# Patient Record
Sex: FEMALE | Race: ASIAN | NOT HISPANIC OR LATINO | Employment: FULL TIME | URBAN - METROPOLITAN AREA
[De-identification: names, ages, dates, MRNs, and addresses within clinical notes are randomized per-mention and may not be internally consistent; named-entity substitution may affect disease eponyms.]

---

## 2024-01-24 ENCOUNTER — OFFICE VISIT (OUTPATIENT)
Dept: FAMILY MEDICINE | Facility: CLINIC | Age: 29
End: 2024-01-24
Payer: COMMERCIAL

## 2024-01-24 VITALS
WEIGHT: 133.19 LBS | DIASTOLIC BLOOD PRESSURE: 68 MMHG | HEIGHT: 62 IN | HEART RATE: 61 BPM | OXYGEN SATURATION: 99 % | BODY MASS INDEX: 24.51 KG/M2 | SYSTOLIC BLOOD PRESSURE: 120 MMHG

## 2024-01-24 DIAGNOSIS — R10.84 ABDOMINAL CRAMPING, GENERALIZED: ICD-10-CM

## 2024-01-24 DIAGNOSIS — Z11.4 ENCOUNTER FOR SCREENING FOR HIV: ICD-10-CM

## 2024-01-24 DIAGNOSIS — Z28.21 INFLUENZA VACCINATION DECLINED: ICD-10-CM

## 2024-01-24 DIAGNOSIS — K52.9 CHRONIC DIARRHEA: ICD-10-CM

## 2024-01-24 DIAGNOSIS — Z11.59 NEED FOR HEPATITIS C SCREENING TEST: ICD-10-CM

## 2024-01-24 DIAGNOSIS — R53.83 FATIGUE, UNSPECIFIED TYPE: ICD-10-CM

## 2024-01-24 DIAGNOSIS — F33.0 MILD EPISODE OF RECURRENT MAJOR DEPRESSIVE DISORDER: ICD-10-CM

## 2024-01-24 DIAGNOSIS — B99.9 RECURRENT INFECTIONS: ICD-10-CM

## 2024-01-24 DIAGNOSIS — Z00.01 ENCOUNTER FOR GENERAL ADULT MEDICAL EXAMINATION WITH ABNORMAL FINDINGS: Primary | ICD-10-CM

## 2024-01-24 DIAGNOSIS — Z30.41 ORAL CONTRACEPTIVE PILL SURVEILLANCE: ICD-10-CM

## 2024-01-24 PROCEDURE — 3074F SYST BP LT 130 MM HG: CPT | Mod: CPTII,S$GLB,, | Performed by: FAMILY MEDICINE

## 2024-01-24 PROCEDURE — 3078F DIAST BP <80 MM HG: CPT | Mod: CPTII,S$GLB,, | Performed by: FAMILY MEDICINE

## 2024-01-24 PROCEDURE — 99385 PREV VISIT NEW AGE 18-39: CPT | Mod: S$GLB,,, | Performed by: FAMILY MEDICINE

## 2024-01-24 PROCEDURE — 3044F HG A1C LEVEL LT 7.0%: CPT | Mod: CPTII,S$GLB,, | Performed by: FAMILY MEDICINE

## 2024-01-24 PROCEDURE — 3008F BODY MASS INDEX DOCD: CPT | Mod: CPTII,S$GLB,, | Performed by: FAMILY MEDICINE

## 2024-01-24 PROCEDURE — 99999 PR PBB SHADOW E&M-NEW PATIENT-LVL IV: CPT | Mod: PBBFAC,,, | Performed by: FAMILY MEDICINE

## 2024-01-24 PROCEDURE — 99213 OFFICE O/P EST LOW 20 MIN: CPT | Mod: 25,S$GLB,, | Performed by: FAMILY MEDICINE

## 2024-01-24 RX ORDER — DROSPIRENONE AND ETHINYL ESTRADIOL 0.02-3(28)
1 KIT ORAL
COMMUNITY
Start: 2023-11-05 | End: 2024-01-24 | Stop reason: SDUPTHER

## 2024-01-24 RX ORDER — FAMOTIDINE 20 MG/1
20 TABLET, FILM COATED ORAL 2 TIMES DAILY
Qty: 180 TABLET | Refills: 0 | Status: SHIPPED | OUTPATIENT
Start: 2024-01-24 | End: 2024-04-26 | Stop reason: SDUPTHER

## 2024-01-24 RX ORDER — SULFAMETHOXAZOLE AND TRIMETHOPRIM 800; 160 MG/1; MG/1
1 TABLET ORAL 2 TIMES DAILY
COMMUNITY
Start: 2023-08-12 | End: 2024-01-24

## 2024-01-24 RX ORDER — DROSPIRENONE AND ETHINYL ESTRADIOL 0.02-3(28)
1 KIT ORAL DAILY
Qty: 84 TABLET | Refills: 3 | Status: SHIPPED | OUTPATIENT
Start: 2024-01-24 | End: 2024-06-12

## 2024-01-24 NOTE — LETTER
January 24, 2024      [facility]  [address]  [place] [address]  Phone: [phone]  Fax: [phone]       Patient: [facility] Alba John   YOB: 1995  Date of Visit: 01/24/2024    To Whom It May Concern:    Deedee John  was at [facility] on 01/24/2024. The patient may return to work/school on 1/25/2024  with no restrictions. If you have any questions or concerns, or if I can be of further assistance, please do not hesitate to contact me.    Sincerely,    Ameena Schafer MD

## 2024-01-29 ENCOUNTER — LAB VISIT (OUTPATIENT)
Dept: PRIMARY CARE CLINIC | Facility: CLINIC | Age: 29
End: 2024-01-29
Payer: COMMERCIAL

## 2024-01-29 DIAGNOSIS — K52.9 CHRONIC DIARRHEA: ICD-10-CM

## 2024-01-29 PROCEDURE — 89055 LEUKOCYTE ASSESSMENT FECAL: CPT | Performed by: FAMILY MEDICINE

## 2024-01-29 PROCEDURE — 87045 FECES CULTURE AEROBIC BACT: CPT | Performed by: FAMILY MEDICINE

## 2024-01-29 PROCEDURE — 87449 NOS EACH ORGANISM AG IA: CPT | Performed by: FAMILY MEDICINE

## 2024-01-29 PROCEDURE — 87427 SHIGA-LIKE TOXIN AG IA: CPT | Mod: 59 | Performed by: FAMILY MEDICINE

## 2024-01-29 PROCEDURE — 83993 ASSAY FOR CALPROTECTIN FECAL: CPT | Performed by: FAMILY MEDICINE

## 2024-01-29 PROCEDURE — 87338 HPYLORI STOOL AG IA: CPT | Performed by: FAMILY MEDICINE

## 2024-01-29 PROCEDURE — 87046 STOOL CULTR AEROBIC BACT EA: CPT | Performed by: FAMILY MEDICINE

## 2024-01-29 PROCEDURE — 82272 OCCULT BLD FECES 1-3 TESTS: CPT | Performed by: FAMILY MEDICINE

## 2024-01-29 PROCEDURE — 82653 EL-1 FECAL QUANTITATIVE: CPT | Performed by: FAMILY MEDICINE

## 2024-01-30 LAB
OB PNL STL: NEGATIVE
WBC #/AREA STL HPF: NORMAL /[HPF]

## 2024-01-31 ENCOUNTER — PATIENT MESSAGE (OUTPATIENT)
Dept: FAMILY MEDICINE | Facility: CLINIC | Age: 29
End: 2024-01-31
Payer: COMMERCIAL

## 2024-01-31 DIAGNOSIS — R11.0 NAUSEA: Primary | ICD-10-CM

## 2024-01-31 DIAGNOSIS — E78.2 MIXED HYPERLIPIDEMIA: ICD-10-CM

## 2024-01-31 DIAGNOSIS — A04.8 H. PYLORI INFECTION: Primary | ICD-10-CM

## 2024-01-31 LAB
E COLI SXT1 STL QL IA: NEGATIVE
E COLI SXT2 STL QL IA: NEGATIVE

## 2024-01-31 RX ORDER — CLARITHROMYCIN 500 MG/1
500 TABLET, FILM COATED ORAL 2 TIMES DAILY
Qty: 28 TABLET | Refills: 0 | Status: SHIPPED | OUTPATIENT
Start: 2024-01-31 | End: 2024-02-22

## 2024-01-31 RX ORDER — PANTOPRAZOLE SODIUM 40 MG/1
40 TABLET, DELAYED RELEASE ORAL 2 TIMES DAILY
Qty: 28 TABLET | Refills: 0 | Status: SHIPPED | OUTPATIENT
Start: 2024-01-31 | End: 2024-02-16 | Stop reason: SDUPTHER

## 2024-01-31 RX ORDER — AMOXICILLIN 500 MG/1
1000 CAPSULE ORAL 2 TIMES DAILY
Qty: 56 CAPSULE | Refills: 0 | Status: SHIPPED | OUTPATIENT
Start: 2024-01-31 | End: 2024-02-22

## 2024-02-01 LAB
BACTERIA STL CULT: NORMAL
CALPROTECTIN STL-MCNT: 11.3 MCG/G
ELASTASE 1, FECAL: >500 MCG/G

## 2024-02-06 PROBLEM — E78.5 HYPERLIPIDEMIA: Status: ACTIVE | Noted: 2018-05-12

## 2024-02-06 LAB — H PYLORI AG STL QL IA: DETECTED

## 2024-02-06 RX ORDER — ONDANSETRON 4 MG/1
4 TABLET, ORALLY DISINTEGRATING ORAL EVERY 12 HOURS PRN
Qty: 60 TABLET | Refills: 0 | Status: SHIPPED | OUTPATIENT
Start: 2024-02-06 | End: 2024-02-16 | Stop reason: SDUPTHER

## 2024-02-16 DIAGNOSIS — A04.8 H. PYLORI INFECTION: ICD-10-CM

## 2024-02-16 DIAGNOSIS — R11.0 NAUSEA: ICD-10-CM

## 2024-02-16 RX ORDER — PANTOPRAZOLE SODIUM 40 MG/1
40 TABLET, DELAYED RELEASE ORAL 2 TIMES DAILY
Qty: 28 TABLET | Refills: 0 | Status: SHIPPED | OUTPATIENT
Start: 2024-02-16 | End: 2024-02-22 | Stop reason: SDUPTHER

## 2024-02-16 RX ORDER — ONDANSETRON 4 MG/1
4 TABLET, ORALLY DISINTEGRATING ORAL EVERY 12 HOURS PRN
Qty: 60 TABLET | Refills: 0 | Status: SHIPPED | OUTPATIENT
Start: 2024-02-16 | End: 2024-02-28 | Stop reason: SDUPTHER

## 2024-02-16 NOTE — TELEPHONE ENCOUNTER
No care due was identified.  [facility] Embedded Care Due Messages. Reference number: [id number].   2/16/2024 2:55:11 PM CST

## 2024-02-16 NOTE — TELEPHONE ENCOUNTER
Refill Routing Note   Medication(s) are not appropriate for processing by [facility] for the following reason(s):        Outside of protocol    [facility] action(s):  Route               Appointments  past 12m or future 3m with PCP    Date Provider   Last Visit   1/24/2024 Ameena Schafer MD   Next Visit   2/22/2024 Ameena Schafer MD   ED visits in past 90 days: 0        Note composed:3:15 PM 02/16/2024

## 2024-02-22 ENCOUNTER — OFFICE VISIT (OUTPATIENT)
Dept: FAMILY MEDICINE | Facility: CLINIC | Age: 29
End: 2024-02-22
Payer: COMMERCIAL

## 2024-02-22 VITALS
DIASTOLIC BLOOD PRESSURE: 62 MMHG | WEIGHT: 134.06 LBS | HEART RATE: 79 BPM | OXYGEN SATURATION: 99 % | SYSTOLIC BLOOD PRESSURE: 118 MMHG | BODY MASS INDEX: 24.67 KG/M2 | HEIGHT: 62 IN

## 2024-02-22 DIAGNOSIS — R11.2 NAUSEA AND VOMITING, UNSPECIFIED VOMITING TYPE: ICD-10-CM

## 2024-02-22 DIAGNOSIS — R68.81 EARLY SATIETY: ICD-10-CM

## 2024-02-22 DIAGNOSIS — F33.0 MILD EPISODE OF RECURRENT MAJOR DEPRESSIVE DISORDER: ICD-10-CM

## 2024-02-22 DIAGNOSIS — E78.49 OTHER HYPERLIPIDEMIA: ICD-10-CM

## 2024-02-22 DIAGNOSIS — K21.9 GASTROESOPHAGEAL REFLUX DISEASE, UNSPECIFIED WHETHER ESOPHAGITIS PRESENT: ICD-10-CM

## 2024-02-22 DIAGNOSIS — A04.8 H. PYLORI INFECTION: Primary | ICD-10-CM

## 2024-02-22 DIAGNOSIS — Z28.21 INFLUENZA VACCINATION DECLINED: ICD-10-CM

## 2024-02-22 DIAGNOSIS — K52.9 CHRONIC DIARRHEA: ICD-10-CM

## 2024-02-22 DIAGNOSIS — B37.9 ANTIBIOTIC-INDUCED YEAST INFECTION: ICD-10-CM

## 2024-02-22 DIAGNOSIS — T36.95XA ANTIBIOTIC-INDUCED YEAST INFECTION: ICD-10-CM

## 2024-02-22 DIAGNOSIS — R10.84 ABDOMINAL CRAMPING, GENERALIZED: ICD-10-CM

## 2024-02-22 PROCEDURE — 1159F MED LIST DOCD IN RCRD: CPT | Mod: CPTII,S$GLB,, | Performed by: FAMILY MEDICINE

## 2024-02-22 PROCEDURE — 3078F DIAST BP <80 MM HG: CPT | Mod: CPTII,S$GLB,, | Performed by: FAMILY MEDICINE

## 2024-02-22 PROCEDURE — 99214 OFFICE O/P EST MOD 30 MIN: CPT | Mod: S$GLB,,, | Performed by: FAMILY MEDICINE

## 2024-02-22 PROCEDURE — 3008F BODY MASS INDEX DOCD: CPT | Mod: CPTII,S$GLB,, | Performed by: FAMILY MEDICINE

## 2024-02-22 PROCEDURE — 1160F RVW MEDS BY RX/DR IN RCRD: CPT | Mod: CPTII,S$GLB,, | Performed by: FAMILY MEDICINE

## 2024-02-22 PROCEDURE — 3074F SYST BP LT 130 MM HG: CPT | Mod: CPTII,S$GLB,, | Performed by: FAMILY MEDICINE

## 2024-02-22 PROCEDURE — 99999 PR PBB SHADOW E&M-EST. PATIENT-LVL IV: CPT | Mod: PBBFAC,,, | Performed by: FAMILY MEDICINE

## 2024-02-22 PROCEDURE — 3044F HG A1C LEVEL LT 7.0%: CPT | Mod: CPTII,S$GLB,, | Performed by: FAMILY MEDICINE

## 2024-02-22 RX ORDER — PANTOPRAZOLE SODIUM 40 MG/1
40 TABLET, DELAYED RELEASE ORAL DAILY
Qty: 90 TABLET | Refills: 1 | Status: SHIPPED | OUTPATIENT
Start: 2024-02-22

## 2024-02-22 RX ORDER — SERTRALINE HYDROCHLORIDE 50 MG/1
TABLET, FILM COATED ORAL
Qty: 90 TABLET | Refills: 0 | Status: SHIPPED | OUTPATIENT
Start: 2024-02-22 | End: 2024-04-22 | Stop reason: SDUPTHER

## 2024-02-22 RX ORDER — FLUCONAZOLE 150 MG/1
150 TABLET ORAL ONCE
Qty: 1 TABLET | Refills: 0 | Status: SHIPPED | OUTPATIENT
Start: 2024-02-22 | End: 2024-02-22

## 2024-02-22 RX ORDER — VALACYCLOVIR HYDROCHLORIDE 1 G/1
2 TABLET, FILM COATED ORAL EVERY 12 HOURS
COMMUNITY

## 2024-02-22 RX ORDER — SULFAMETHOXAZOLE AND TRIMETHOPRIM 800; 160 MG/1; MG/1
1 TABLET ORAL 2 TIMES DAILY
COMMUNITY
End: 2024-02-22

## 2024-02-22 RX ORDER — ALBUTEROL SULFATE 90 UG/1
AEROSOL, METERED RESPIRATORY (INHALATION)
COMMUNITY

## 2024-02-22 NOTE — PROGRESS NOTES
"Subjective:         Patient ID: Annelise John is a 29 y.o. female.    Chief Complaint: Results    Patient Active Problem List   Diagnosis    Mild episode of recurrent major depressive disorder    Chronic diarrhea    H. pylori infection    Hyperlipidemia      CYNDEE Willis is a 29 y.o. female who presents today for review of lab results.   Completed H. Pylori treatment, reports some mild improvement in symptoms, but not completely.     High functioning depression discussed in last visit. Interested in starting SSRI.    Review of Systems   All other systems reviewed and are negative.       Objective:     Vitals:    02/22/24 1249   BP: 118/62   BP Location: Left arm   Patient Position: Sitting   BP Method: Medium (Manual)   Pulse: 79   SpO2: 99%   Weight: 60.8 kg (134 lb 0.6 oz)   Height: 5' 2" (1.575 m)         Physical Exam  Vitals and nursing note reviewed.   Constitutional:       General: She is not in acute distress.     Appearance: Normal appearance. She is not ill-appearing, toxic-appearing or diaphoretic.   HENT:      Head: Normocephalic and atraumatic.   Eyes:      General: No scleral icterus.     Conjunctiva/sclera: Conjunctivae normal.   Cardiovascular:      Rate and Rhythm: Normal rate.   Pulmonary:      Effort: Pulmonary effort is normal. No respiratory distress.   Skin:     Coloration: Skin is not pale.   Neurological:      Mental Status: She is alert. Mental status is at baseline.   Psychiatric:         Attention and Perception: Attention and perception normal.         Mood and Affect: Mood and affect normal.         Speech: Speech normal.         Behavior: Behavior normal.         Cognition and Memory: Cognition and memory normal.         Judgment: Judgment normal.       Assessment:       1. H. pylori infection    2. Nausea and vomiting, unspecified vomiting type    3. Abdominal cramping, generalized    4. Antibiotic-induced yeast infection    5. Gastroesophageal reflux disease, unspecified " whether esophagitis present    6. Early satiety    7. Mild episode of recurrent major depressive disorder    8. Influenza vaccination declined    9. Chronic diarrhea    10. Other hyperlipidemia        Plan:   Recent relevant labs results reviewed with patient.     Lab results do not support an inflammatory process. Gastric emptying study given burping up food hours later and early satiety. Take ppi and GI appt.       1. H. pylori infection  -     Ambulatory referral/consult to Gastroenterology; Future; Expected date: 02/29/2024  Treated, symptoms more complex than explained by H. Pylori infection alone. Continue ppi.     2. Nausea and vomiting, unspecified vomiting type  3. Abdominal cramping, generalized  -     Ambulatory referral/consult to Gastroenterology; Future; Expected date: 02/29/2024  GI work up  -     Ambulatory referral/consult to Gastroenterology; Future; Expected date: 02/29/2024  -     NM Gastric Emptying; Future; Expected date: 02/22/2024    4. Antibiotic-induced yeast infection  -     fluconazole (DIFLUCAN) 150 MG Tab; Take 1 tablet (150 mg total) by mouth once. for 1 dose  Dispense: 1 tablet; Refill: 0    5. Gastroesophageal reflux disease, unspecified whether esophagitis present  -     pantoprazole (PROTONIX) 40 MG tablet; Take 1 tablet (40 mg total) by mouth once daily.  Dispense: 90 tablet; Refill: 1    6. Early satiety  -     NM Gastric Emptying; Future; Expected date: 02/22/2024    7. Mild episode of recurrent major depressive disorder  -     sertraline (ZOLOFT) 50 MG tablet; Take 1 tab po daily x 2 weeks, then 2 tabs po daily thereafter  Dispense: 90 tablet; Refill: 0    8. Influenza vaccination declined    HLD - lifestyle modifications.     Patient's questions answered. Plan reviewed with patient at the end of visit. Relevant precautions to chief complaint and reasons to seek further medical care or to contact the office sooner reviewed with patient.     Follow up in about 6 weeks (around  4/4/2024) for Pap Smear, f/u s/p new medication/ titration.

## 2024-02-28 ENCOUNTER — PATIENT MESSAGE (OUTPATIENT)
Dept: FAMILY MEDICINE | Facility: CLINIC | Age: 29
End: 2024-02-28
Payer: COMMERCIAL

## 2024-02-28 DIAGNOSIS — R11.0 NAUSEA: ICD-10-CM

## 2024-02-28 RX ORDER — ONDANSETRON 4 MG/1
4 TABLET, ORALLY DISINTEGRATING ORAL EVERY 12 HOURS PRN
Qty: 60 TABLET | Refills: 0 | Status: SHIPPED | OUTPATIENT
Start: 2024-02-28 | End: 2024-03-01 | Stop reason: SDUPTHER

## 2024-02-28 NOTE — TELEPHONE ENCOUNTER
No care due was identified.  [facility] Embedded Care Due Messages. Reference number: [id number].   2/28/2024 1:57:41 PM CST

## 2024-02-28 NOTE — TELEPHONE ENCOUNTER
Refill Routing Note   Medication(s) are not appropriate for processing by [facility] for the following reason(s):        Outside of protocol    [facility] action(s):  Route               Appointments  past 12m or future 3m with PCP    Date Provider   Last Visit   2/22/2024 Ameena Schafer MD   Next Visit   3/5/2024 Ameena Schafer MD   ED visits in past 90 days: 0        Note composed:3:03 PM 02/28/2024

## 2024-03-01 DIAGNOSIS — R19.4 CHANGE IN BOWEL HABITS: ICD-10-CM

## 2024-03-01 DIAGNOSIS — R11.0 NAUSEA: ICD-10-CM

## 2024-03-01 DIAGNOSIS — R10.13 ABDOMINAL PAIN, EPIGASTRIC: Primary | ICD-10-CM

## 2024-03-01 RX ORDER — ONDANSETRON 4 MG/1
4 TABLET, ORALLY DISINTEGRATING ORAL EVERY 12 HOURS PRN
Qty: 60 TABLET | Refills: 0 | Status: SHIPPED | OUTPATIENT
Start: 2024-03-01

## 2024-03-01 NOTE — TELEPHONE ENCOUNTER
No care due was identified.  [facility] Embedded Care Due Messages. Reference number: [id number].   3/01/2024 1:16:16 PM CST

## 2024-03-01 NOTE — TELEPHONE ENCOUNTER
Refill Routing Note   Medication(s) are not appropriate for processing by [facility] for the following reason(s):        Outside of protocol    [facility] action(s):  Route               Appointments  past 12m or future 3m with PCP    Date Provider   Last Visit   2/22/2024 Ameena Schafer MD   Next Visit   4/8/2024 Ameena Schafer MD   ED visits in past 90 days: 0        Note composed:1:42 PM 03/01/2024

## 2024-03-04 NOTE — PROGRESS NOTES
Questions encouraged.Pt denied additional need for information.Contact number left in case questions should arise.

## 2024-03-05 ENCOUNTER — ANESTHESIA EVENT (OUTPATIENT)
Dept: SURGERY | Facility: HOSPITAL | Age: 29
End: 2024-03-05
Payer: COMMERCIAL

## 2024-03-05 ENCOUNTER — HOSPITAL ENCOUNTER (OUTPATIENT)
Facility: HOSPITAL | Age: 29
Discharge: HOME | End: 2024-03-05
Attending: INTERNAL MEDICINE | Admitting: INTERNAL MEDICINE
Payer: COMMERCIAL

## 2024-03-05 ENCOUNTER — ANESTHESIA (OUTPATIENT)
Dept: SURGERY | Facility: HOSPITAL | Age: 29
End: 2024-03-05
Payer: COMMERCIAL

## 2024-03-05 VITALS
TEMPERATURE: 98 F | HEIGHT: 62 IN | HEART RATE: 52 BPM | WEIGHT: 125 LBS | DIASTOLIC BLOOD PRESSURE: 47 MMHG | RESPIRATION RATE: 17 BRPM | OXYGEN SATURATION: 99 % | SYSTOLIC BLOOD PRESSURE: 83 MMHG | BODY MASS INDEX: 23 KG/M2

## 2024-03-05 PROCEDURE — 37000008 HC ANESTHESIA 1ST 15 MINUTES: Performed by: INTERNAL MEDICINE

## 2024-03-05 PROCEDURE — 43239 EGD BIOPSY SINGLE/MULTIPLE: CPT | Performed by: INTERNAL MEDICINE

## 2024-03-05 PROCEDURE — 27200043 HC FORCEPS, BIOPSY: Performed by: INTERNAL MEDICINE

## 2024-03-05 PROCEDURE — 63600175 PHARM REV CODE 636 W HCPCS: Performed by: NURSE ANESTHETIST, CERTIFIED REGISTERED

## 2024-03-05 PROCEDURE — 45380 COLONOSCOPY AND BIOPSY: CPT | Performed by: INTERNAL MEDICINE

## 2024-03-05 PROCEDURE — 25000003 PHARM REV CODE 250: Performed by: INTERNAL MEDICINE

## 2024-03-05 PROCEDURE — 25000003 PHARM REV CODE 250: Performed by: NURSE ANESTHETIST, CERTIFIED REGISTERED

## 2024-03-05 PROCEDURE — D9220A PRA ANESTHESIA: Mod: ANES,,, | Performed by: ANESTHESIOLOGY

## 2024-03-05 PROCEDURE — 37000009 HC ANESTHESIA EA ADD 15 MINS: Performed by: INTERNAL MEDICINE

## 2024-03-05 PROCEDURE — D9220A PRA ANESTHESIA: Mod: CRNA,,, | Performed by: NURSE ANESTHETIST, CERTIFIED REGISTERED

## 2024-03-05 RX ORDER — PROPOFOL 10 MG/ML
VIAL (ML) INTRAVENOUS
Status: DISCONTINUED | OUTPATIENT
Start: 2024-03-05 | End: 2024-03-05

## 2024-03-05 RX ORDER — LIDOCAINE HYDROCHLORIDE 20 MG/ML
INJECTION INTRAVENOUS
Status: DISCONTINUED | OUTPATIENT
Start: 2024-03-05 | End: 2024-03-05

## 2024-03-05 RX ADMIN — PROPOFOL 50 MG: 10 INJECTION, EMULSION INTRAVENOUS at 10:03

## 2024-03-05 RX ADMIN — PROPOFOL 50 MG: 10 INJECTION, EMULSION INTRAVENOUS at 09:03

## 2024-03-05 RX ADMIN — SODIUM CHLORIDE: 900 INJECTION, SOLUTION INTRAVENOUS at 09:03

## 2024-03-05 RX ADMIN — LIDOCAINE HYDROCHLORIDE 20 MG: 20 INJECTION, SOLUTION INTRAVENOUS at 09:03

## 2024-03-05 NOTE — [employer]OVATION PATIENT INSTRUCTIONS
Discharge Summary/Instructions after an Endoscopic Procedure  Patient Name: Annelise John  Patient MRN: [id number]  Patient YOB: 1995 Tuesday, March 5, 2024  Rivera Nava III, MD  RESTRICTIONS:  During your procedure today, you received medications for sedation.  These   medications may affect your judgment, balance and coordination.  Therefore,   for 24 hours, you have the following restrictions:   - DO NOT drive a car, operate machinery, make legal/financial decisions,   sign important papers or drink alcohol.    ACTIVITY:  Today: no heavy lifting, straining or running due to procedural   sedation/anesthesia.  The following day: return to full activity including work.  DIET:  Eat and drink normally unless instructed otherwise.     TREATMENT FOR COMMON SIDE EFFECTS:  - Mild abdominal pain, nausea, belching, bloating or excessive gas:  rest,   eat lightly and use a heating pad.  - Sore Throat: treat with throat lozenges and/or gargle with warm salt   water.  - Because air was used during the procedure, expelling large amounts of air   from your rectum or belching is normal.  - If a bowel prep was taken, you may not have a bowel movement for 1-3 days.    This is normal.  SYMPTOMS TO WATCH FOR AND REPORT TO YOUR PHYSICIAN:  1. Abdominal pain or bloating, other than gas cramps.  2. Chest pain.  3. Back pain.  4. Signs of infection such as: chills or fever occurring within 24 hours   after the procedure.  5. Rectal bleeding, which would show as bright red, maroon, or black stools.   (A tablespoon of blood from the rectum is not serious, especially if   hemorrhoids are present.)  6. Vomiting.  7. Weakness or dizziness.  GO DIRECTLY TO THE NEAREST EMERGENCY ROOM IF YOU HAVE ANY OF THE FOLLOWING:      Difficulty breathing              Chills and/or fever over 101 F   Persistent vomiting and/or vomiting blood   Severe abdominal pain   Severe chest pain   Black, tarry stools   Bleeding- more than one  tablespoon   Any other symptom or condition that you feel may need urgent attention  Your doctor recommends these additional instructions:  If any biopsies were taken, your doctors clinic will contact you in 1 to 2   weeks with any results.  - Discharge patient to home (ambulatory).   - Patient has a contact number available for emergencies.  The signs and   symptoms of potential delayed complications were discussed with the   patient.  Return to normal activities tomorrow.  Written discharge   instructions were provided to the patient.   - Resume previous diet.   - Continue present medications.   - Repeat colonoscopy at age 44 for screening purposes.  For questions, problems or results please call your physician - Rivera Nava III, MD at Work:  [phone].  [facility], EMERGENCY ROOM PHONE NUMBER: [phone]  IF A COMPLICATION OR EMERGENCY SITUATION ARISES AND YOU ARE UNABLE TO REACH   YOUR PHYSICIAN - GO DIRECTLY TO THE EMERGENCY ROOM.  Rivera Nava III, MD  3/5/2024 10:10:24 AM  This report has been verified and signed electronically.  Dear patient,  As a result of recent federal legislation (The Federal Cures Act), you may   receive lab or pathology results from your procedure in your [facility]   account before your physician is able to contact you. Your physician or   their representative will relay the results to you with their   recommendations at their soonest availability.  Thank you,  PROVATION

## 2024-03-05 NOTE — PROVATION PATIENT INSTRUCTIONS
Discharge Summary/Instructions after an Endoscopic Procedure  Patient Name: Annelise John  Patient MRN: [id number]  Patient YOB: 1995 Tuesday, March 5, 2024  Rivera Nava III, MD  RESTRICTIONS:  During your procedure today, you received medications for sedation.  These   medications may affect your judgment, balance and coordination.  Therefore,   for 24 hours, you have the following restrictions:   - DO NOT drive a car, operate machinery, make legal/financial decisions,   sign important papers or drink alcohol.    ACTIVITY:  Today: no heavy lifting, straining or running due to procedural   sedation/anesthesia.  The following day: return to full activity including work.  DIET:  Eat and drink normally unless instructed otherwise.     TREATMENT FOR COMMON SIDE EFFECTS:  - Mild abdominal pain, nausea, belching, bloating or excessive gas:  rest,   eat lightly and use a heating pad.  - Sore Throat: treat with throat lozenges and/or gargle with warm salt   water.  - Because air was used during the procedure, expelling large amounts of air   from your rectum or belching is normal.  - If a bowel prep was taken, you may not have a bowel movement for 1-3 days.    This is normal.  SYMPTOMS TO WATCH FOR AND REPORT TO YOUR PHYSICIAN:  1. Abdominal pain or bloating, other than gas cramps.  2. Chest pain.  3. Back pain.  4. Signs of infection such as: chills or fever occurring within 24 hours   after the procedure.  5. Rectal bleeding, which would show as bright red, maroon, or black stools.   (A tablespoon of blood from the rectum is not serious, especially if   hemorrhoids are present.)  6. Vomiting.  7. Weakness or dizziness.  GO DIRECTLY TO THE NEAREST EMERGENCY ROOM IF YOU HAVE ANY OF THE FOLLOWING:      Difficulty breathing              Chills and/or fever over 101 F   Persistent vomiting and/or vomiting blood   Severe abdominal pain   Severe chest pain   Black, tarry stools   Bleeding- more than one  tablespoon   Any other symptom or condition that you feel may need urgent attention  Your doctor recommends these additional instructions:  If any biopsies were taken, your doctors clinic will contact you in 1 to 2   weeks with any results.  - Patient has a contact number available for emergencies.  The signs and   symptoms of potential delayed complications were discussed with the   patient.  Return to normal activities tomorrow.  Written discharge   instructions were provided to the patient.   - Discharge patient to home (with escort).   - Await pathology results.  For questions, problems or results please call your physician - Rivera Nava III, MD at Work:  [phone].  [facility], EMERGENCY ROOM PHONE NUMBER: [phone]  IF A COMPLICATION OR EMERGENCY SITUATION ARISES AND YOU ARE UNABLE TO REACH   YOUR PHYSICIAN - GO DIRECTLY TO THE EMERGENCY ROOM.  Rivera Nava III, MD  3/5/2024 10:08:24 AM  This report has been verified and signed electronically.  Dear patient,  As a result of recent federal legislation (The Federal Cures Act), you may   receive lab or pathology results from your procedure in your [facility]   account before your physician is able to contact you. Your physician or   their representative will relay the results to you with their   recommendations at their soonest availability.  Thank you,  PROVATION

## 2024-03-05 NOTE — H&P
GASTROENTEROLOGY PRE-PROCEDURE H&P NOTE  Patient Name: Annelise John  Patient MRN: [id number]  Patient : 1995    Service date: 3/5/2024    PCP: Ameena Schafer MD    No chief complaint on file.      HPI: Patient is a 29 y.o. female with PMHx as below here for evaluation of f/u H. Pylori, abd pain, bowel changes.     Past Medical History:  History reviewed. No pertinent past medical history.     Past Surgical History:  History reviewed. No pertinent surgical history.     Home Medications:  Medications Prior to Admission   Medication Sig Dispense Refill Last Dose    albuterol (PROVENTIL/VENTOLIN HFA) 90 mcg/actuation inhaler        drospirenone-ethinyl estradioL (BROOKLYN) 3-0.02 mg per tablet Take 1 tablet by mouth once daily. 84 tablet 3     famotidine (PEPCID) 20 MG tablet Take 1 tablet (20 mg total) by mouth 2 (two) times daily. 180 tablet 0     ondansetron (ZOFRAN-ODT) 4 MG TbDL Take 1 tablet (4 mg total) by mouth every 12 (twelve) hours as needed (nausea). 60 tablet 0     pantoprazole (PROTONIX) 40 MG tablet Take 1 tablet (40 mg total) by mouth once daily. 90 tablet 1     sertraline (ZOLOFT) 50 MG tablet Take 1 tab po daily x 2 weeks, then 2 tabs po daily thereafter 90 tablet 0     valACYclovir (VALTREX) 1000 MG tablet Take 2 tablets by mouth every 12 (twelve) hours.                  Review of patient's allergies indicates:  No Known Allergies    Social History:   Social History     Occupational History    Not on file   Tobacco Use    Smoking status: Never    Smokeless tobacco: Never   Substance and Sexual Activity    Alcohol use: Yes     Alcohol/week: 1.0 standard drink of alcohol     Types: 1 Shots of liquor per week    Drug use: Never    Sexual activity: Not on file       Family History:   Family History   Problem Relation Age of Onset    Rheum arthritis Mother     Hypertension Mother     Iron deficiency Mother     Hyperlipidemia Mother     Liver disease Father     Hyperlipidemia Father     Neuropathy  "Father     GI problems Brother     Kidney disease Maternal Grandmother     Alzheimer's disease Maternal Grandfather     GI problems Paternal Grandmother     Diabetes Paternal Grandmother     Stroke Paternal Grandmother     Heart attack Paternal Grandmother     Lymphoma Paternal Grandfather        Review of Systems:  A 10 point review of systems was performed and was normal, except as mentioned in the HPI, including constitutional, HEENT, heme, lymph, cardiovascular, respiratory, gastrointestinal, genitourinary, neurologic, endocrine, psychiatric and musculoskeletal.      OBJECTIVE:    Physical Exam:  24 Hour Vital Sign Ranges: Temp:  [98.6 °F (37 °C)] 98.6 °F (37 °C)  Pulse:  [83] 83  Resp:  [17] 17  SpO2:  [99 %] 99 %  BP: (110)/(74) 110/74  Most recent vitals: /74   Pulse 83   Temp 98.6 °F (37 °C)   Resp 17   Ht 5' 2" (1.575 m)   Wt 56.7 kg (125 lb)   LMP 01/22/2024 (Approximate)   SpO2 99%   Breastfeeding No   BMI 22.86 kg/m²    GEN: well-developed, well-nourished, awake and alert, non-toxic appearing adult  HEENT: PERRL, sclera anicteric, oral mucosa pink and moist without lesion  NECK: trachea midline; Good ROM  CV: regular rate and rhythm, no murmurs or gallops  RESP: clear to auscultation bilaterally, no wheezes, rhonci or rales  ABD: soft, non-tender, non-distended, normal bowel sounds  EXT: no swelling or edema, 2+ pulses distally  SKIN: no rashes or jaundice  PSYCH: normal affect    Labs:   No results for input(s): "WBC", "MCV", "PLT" in the last 72 hours.    Invalid input(s): "HGBAU"  No results for input(s): "NA", "K", "CL", "CO2", "BUN", "GLU" in the last 72 hours.    Invalid input(s): "CREA"  No results for input(s): "ALB" in the last 72 hours.    Invalid input(s): "ALKP", "SGOT", "SGPT", "TBIL", "DBIL", "TPRO"  No results for input(s): "PT", "INR", "PTT" in the last 72 hours.      IMPRESSION / RECOMMENDATIONS:  EGD/Colon  with interventions as warranted.   RIsks, benefits, alternatives " discussed in detail regarding upcoming procedures and sedation. Some of the more common endoscopic complications include but not limited to immediate or delayed perforation, bleeding, infections, pain, inadvertent injury to surrounding tissue / organs and possible need for surgical evaluation. Patient expressed understanding, all questions answered and will proceed with procedure as planned.     Rivera Nava III  3/5/2024  9:27 AM

## 2024-03-05 NOTE — ANESTHESIA PREPROCEDURE EVALUATION
03/05/2024  [facility] Alba John is a 29 y.o., female.    Patient Active Problem List   Diagnosis    Mild episode of recurrent major depressive disorder    Chronic diarrhea    H. pylori infection    Hyperlipidemia       No past surgical history on file.     Tobacco Use:  The patient  reports that she has never smoked. She has never used smokeless tobacco.     No results found for this or any previous visit.          Lab Results   Component Value Date    WBC 5.53 01/24/2024    HGB 14.4 01/24/2024    HCT 45.8 01/24/2024    MCV 95 01/24/2024     01/24/2024     BMP  Lab Results   Component Value Date     01/24/2024    K 4.2 01/24/2024     01/24/2024    CO2 24 01/24/2024    BUN 12 01/24/2024    CREATININE 0.7 01/24/2024    CALCIUM 10.5 01/24/2024    ANIONGAP 8 01/24/2024    GLU 84 01/24/2024       No results found for this or any previous visit.            Pre-op Assessment    I have reviewed the Patient Summary Reports.     I have reviewed the Nursing Notes. I have reviewed the NPO Status.   I have reviewed the Medications.     Review of Systems  Anesthesia Hx:  No problems with previous Anesthesia             Denies Family Hx of Anesthesia complications.    Denies Personal Hx of Anesthesia complications.                    Social:  Non-Smoker       Hematology/Oncology:  Hematology Normal                                     EENT/Dental:  EENT/Dental Normal           Cardiovascular:  Cardiovascular Normal                                            Pulmonary:  Pulmonary Normal                       Renal/:  Renal/ Normal                 Hepatic/GI:  Hepatic/GI Normal       Epigastric pain.    Changes in bowel habits.  Chronic diarrhea.  H.Pylori          Musculoskeletal:  Musculoskeletal Normal                Neurological:  Neurology Normal                                       Endocrine:  Endocrine Normal            Psych:    depression                Physical Exam  General: Well nourished    Airway:  Mallampati: II   Mouth Opening: Normal  TM Distance: Normal  Tongue: Normal  Neck ROM: Normal ROM    Chest/Lungs:  Clear to auscultation, Normal Respiratory Rate    Heart:  Rate: Normal  Rhythm: Regular Rhythm        Anesthesia Plan  Type of Anesthesia, risks & benefits discussed:    Anesthesia Type: Gen Natural Airway  Intra-op Monitoring Plan: Standard ASA Monitors  Post Op Pain Control Plan:   (medical reason for not using multimodal pain management)  Induction:  IV  Informed Consent: Informed consent signed with the Patient and all parties understand the risks and agree with anesthesia plan.  All questions answered.   ASA Score: 2  Anesthesia Plan Notes: General with natural airway.    Propofol  POM    Ready For Surgery From Anesthesia Perspective.     .

## 2024-03-05 NOTE — TRANSFER OF CARE
"Anesthesia Transfer of Care Note    Patient: Annelise John    Procedure(s) Performed: Procedure(s) (LRB):  COLONOSCOPY (N/A)  EGD (ESOPHAGOGASTRODUODENOSCOPY) (N/A)    Patient location: GI    Anesthesia Type: general    Transport from OR: Transported from OR on room air with adequate spontaneous ventilation    Post pain: adequate analgesia    Post assessment: no apparent anesthetic complications    Post vital signs: stable    Level of consciousness: awake and alert    Nausea/Vomiting: no nausea/vomiting    Complications: none    Transfer of care protocol was followed      Last vitals: Visit Vitals  /74   Pulse 83   Temp 37 °C (98.6 °F)   Resp 17   Ht 5' 2" (1.575 m)   Wt 56.7 kg (125 lb)   LMP 01/22/2024 (Approximate)   SpO2 99%   Breastfeeding No   BMI 22.86 kg/m²     "

## 2024-03-05 NOTE — ANESTHESIA POSTPROCEDURE EVALUATION
Anesthesia Post Evaluation    Patient: Annelise John    Procedure(s) Performed: Procedure(s) (LRB):  COLONOSCOPY (N/A)  EGD (ESOPHAGOGASTRODUODENOSCOPY) (N/A)    Final Anesthesia Type: general      Patient location during evaluation: GI PACU  Patient participation: Yes- Able to Participate  Level of consciousness: awake and alert  Post-procedure vital signs: reviewed and stable  Pain management: adequate  Airway patency: patent    PONV status at discharge: No PONV  Anesthetic complications: no      Cardiovascular status: blood pressure returned to baseline and stable  Respiratory status: unassisted and room air  Hydration status: euvolemic  Follow-up not needed.              Vitals Value Taken Time   BP 83/47 03/05/24 1020   Temp 36.9 °C (98.4 °F) 03/05/24 1029   Pulse 52 03/05/24 1025   Resp 18 03/05/24 1029   SpO2 99 % 03/05/24 1025   Vitals shown include unvalidated device data.      No case tracking events are documented in the log.      Pain/Estuardo Score: No data recorded

## 2024-03-11 ENCOUNTER — PATIENT MESSAGE (OUTPATIENT)
Dept: FAMILY MEDICINE | Facility: CLINIC | Age: 29
End: 2024-03-11
Payer: COMMERCIAL

## 2024-04-08 ENCOUNTER — OFFICE VISIT (OUTPATIENT)
Dept: FAMILY MEDICINE | Facility: CLINIC | Age: 29
End: 2024-04-08
Payer: COMMERCIAL

## 2024-04-08 VITALS
HEART RATE: 90 BPM | WEIGHT: 127.44 LBS | SYSTOLIC BLOOD PRESSURE: 100 MMHG | HEIGHT: 62 IN | BODY MASS INDEX: 23.45 KG/M2 | OXYGEN SATURATION: 98 % | DIASTOLIC BLOOD PRESSURE: 62 MMHG

## 2024-04-08 DIAGNOSIS — Z01.419 WELL WOMAN EXAM WITH ROUTINE GYNECOLOGICAL EXAM: Primary | ICD-10-CM

## 2024-04-08 DIAGNOSIS — Z12.4 SCREENING FOR CERVICAL CANCER: ICD-10-CM

## 2024-04-08 DIAGNOSIS — A04.8 H. PYLORI INFECTION: ICD-10-CM

## 2024-04-08 PROCEDURE — 88175 CYTOPATH C/V AUTO FLUID REDO: CPT | Performed by: FAMILY MEDICINE

## 2024-04-08 PROCEDURE — 1159F MED LIST DOCD IN RCRD: CPT | Mod: CPTII,S$GLB,, | Performed by: FAMILY MEDICINE

## 2024-04-08 PROCEDURE — 99999 PR PBB SHADOW E&M-EST. PATIENT-LVL III: CPT | Mod: PBBFAC,,, | Performed by: FAMILY MEDICINE

## 2024-04-08 PROCEDURE — 1160F RVW MEDS BY RX/DR IN RCRD: CPT | Mod: CPTII,S$GLB,, | Performed by: FAMILY MEDICINE

## 2024-04-08 PROCEDURE — 3074F SYST BP LT 130 MM HG: CPT | Mod: CPTII,S$GLB,, | Performed by: FAMILY MEDICINE

## 2024-04-08 PROCEDURE — 87624 HPV HI-RISK TYP POOLED RSLT: CPT | Performed by: FAMILY MEDICINE

## 2024-04-08 PROCEDURE — 3078F DIAST BP <80 MM HG: CPT | Mod: CPTII,S$GLB,, | Performed by: FAMILY MEDICINE

## 2024-04-08 PROCEDURE — 3044F HG A1C LEVEL LT 7.0%: CPT | Mod: CPTII,S$GLB,, | Performed by: FAMILY MEDICINE

## 2024-04-08 PROCEDURE — 3008F BODY MASS INDEX DOCD: CPT | Mod: CPTII,S$GLB,, | Performed by: FAMILY MEDICINE

## 2024-04-08 PROCEDURE — 99395 PREV VISIT EST AGE 18-39: CPT | Mod: S$GLB,,, | Performed by: FAMILY MEDICINE

## 2024-04-08 NOTE — PROGRESS NOTES
"Subjective:         Patient ID: Tram Mindy John is a 29 y.o. female.    Chief Complaint: Gynecologic Exam    Patient Active Problem List   Diagnosis    Mild episode of recurrent major depressive disorder    Chronic diarrhea    H. pylori infection    Hyperlipidemia      CYNDEE Willis is a 29 y.o. female who presents today for pap smear.     Treated for H. Pylori again after positive biopsy during EGD. Completed treatment and off ppi. Symptoms much improved.     Review of Systems   All other systems reviewed and are negative.       Objective:     Vitals:    04/08/24 1058   BP: 100/62   BP Location: Left arm   Patient Position: Sitting   BP Method: Medium (Manual)   Pulse: 90   SpO2: 98%   Weight: 57.8 kg (127 lb 6.8 oz)   Height: 5' 2" (1.575 m)         Physical Exam  Vitals and nursing note reviewed. Exam conducted with a chaperone present.   Constitutional:       General: She is not in acute distress.     Appearance: Normal appearance. She is not ill-appearing, toxic-appearing or diaphoretic.   HENT:      Head: Normocephalic and atraumatic.   Eyes:      General: No scleral icterus.     Conjunctiva/sclera: Conjunctivae normal.   Cardiovascular:      Rate and Rhythm: Normal rate.   Pulmonary:      Effort: Pulmonary effort is normal. No respiratory distress.   Genitourinary:     General: Normal vulva.      Exam position: Lithotomy position.      Pubic Area: No rash.       Labia:         Right: No rash, tenderness, lesion or injury.         Left: No rash, tenderness, lesion or injury.       Urethra: No prolapse, urethral pain, urethral swelling or urethral lesion.      Vagina: Normal. No lesions.      Cervix: No cervical motion tenderness, lesion or erythema.      Uterus: Normal. Not tender.       Adnexa: Right adnexa normal and left adnexa normal.        Right: No mass, tenderness or fullness.          Left: No mass, tenderness or fullness.     Skin:     General: Skin is dry.      Coloration: Skin is not pale. "   Neurological:      Mental Status: She is alert. Mental status is at baseline.   Psychiatric:         Attention and Perception: Attention and perception normal.         Mood and Affect: Mood and affect normal.         Speech: Speech normal.         Behavior: Behavior normal.         Cognition and Memory: Cognition and memory normal.         Judgment: Judgment normal.       Assessment:       1. Well woman exam with routine gynecological exam    2. Screening for cervical cancer    3. H. pylori infection        Plan:   Recent relevant labs results reviewed with patient.         1. Well woman exam with routine gynecological exam  2. Screening for cervical cancer  -     Liquid-Based Pap Smear, Screening  -     HPV High Risk Genotypes, PCR    3. H. pylori infection  Overview:  Treated 02/2024  Orders:  -     H. pylori antigen, stool; Future; Expected date: 04/08/2024    Patient's questions answered. Plan reviewed with patient at the end of visit. Relevant precautions to chief complaint and reasons to seek further medical care or to contact the office sooner reviewed with patient.     Follow up in about 9 months (around 1/8/2025) for Annual Exam.

## 2024-04-12 LAB
HPV HR 12 DNA SPEC QL NAA+PROBE: NEGATIVE
HPV16 AG SPEC QL: NEGATIVE
HPV18 DNA SPEC QL NAA+PROBE: NEGATIVE

## 2024-04-16 LAB
FINAL PATHOLOGIC DIAGNOSIS: NORMAL
Lab: NORMAL

## 2024-04-22 DIAGNOSIS — F33.0 MILD EPISODE OF RECURRENT MAJOR DEPRESSIVE DISORDER: ICD-10-CM

## 2024-04-22 NOTE — TELEPHONE ENCOUNTER
No care due was identified.  Health [facility] Embedded Care Due Messages. Reference number: [id number].   4/22/2024 1:26:42 PM CDT

## 2024-04-23 RX ORDER — SERTRALINE HYDROCHLORIDE 50 MG/1
TABLET, FILM COATED ORAL
Qty: 90 TABLET | Refills: 0 | Status: SHIPPED | OUTPATIENT
Start: 2024-04-23 | End: 2024-06-17 | Stop reason: SDUPTHER

## 2024-04-23 NOTE — TELEPHONE ENCOUNTER
Refill Routing Note   Medication(s) are not appropriate for processing by [facility] for the following reason(s):        New or recently adjusted medication    [facility] action(s):  Defer             Appointments  past 12m or future 3m with PCP    Date Provider   Last Visit   4/8/2024 Ameena Schafer MD   Next Visit   Visit date not found Ameena Schafer MD   ED visits in past 90 days: 0        Note composed:12:31 PM 04/23/2024

## 2024-04-25 DIAGNOSIS — F33.0 MILD EPISODE OF RECURRENT MAJOR DEPRESSIVE DISORDER: ICD-10-CM

## 2024-04-25 NOTE — TELEPHONE ENCOUNTER
No care due was identified.  [facility] Embedded Care Due Messages. Reference number: [id number].   4/25/2024 1:55:55 PM CDT

## 2024-04-26 DIAGNOSIS — R10.84 ABDOMINAL CRAMPING, GENERALIZED: ICD-10-CM

## 2024-04-26 RX ORDER — SERTRALINE HYDROCHLORIDE 50 MG/1
TABLET, FILM COATED ORAL
Qty: 90 TABLET | Refills: 0 | OUTPATIENT
Start: 2024-04-26

## 2024-04-26 RX ORDER — FAMOTIDINE 20 MG/1
20 TABLET, FILM COATED ORAL 2 TIMES DAILY
Qty: 180 TABLET | Refills: 2 | Status: SHIPPED | OUTPATIENT
Start: 2024-04-26

## 2024-04-26 NOTE — TELEPHONE ENCOUNTER
No care due was identified.  [facility] Embedded Care Due Messages. Reference number: [id number].   4/26/2024 8:04:28 AM CDT

## 2024-04-26 NOTE — TELEPHONE ENCOUNTER
Refill Decision Note   Tram [facility] Alvaro  is requesting a refill authorization.  Brief Assessment and Rationale for Refill:  Quick Discontinue     Medication Therapy Plan:  Receipt confirmed by pharmacy (4/23/2024  4:51 PM CDT)      Comments:     Note composed:6:55 AM 04/26/2024

## 2024-04-26 NOTE — TELEPHONE ENCOUNTER
Refill Decision Note   Tram [facility] John  is requesting a refill authorization.  Brief Assessment and Rationale for Refill:  Approve     Medication Therapy Plan:         Comments:     Note composed:3:10 PM 04/26/2024

## 2024-06-12 ENCOUNTER — HOSPITAL ENCOUNTER (EMERGENCY)
Facility: HOSPITAL | Age: 29
Discharge: HOME | End: 2024-06-12
Attending: EMERGENCY MEDICINE
Payer: COMMERCIAL

## 2024-06-12 DIAGNOSIS — G43.109 BASILAR MIGRAINE: Primary | ICD-10-CM

## 2024-06-12 DIAGNOSIS — R42 VERTIGO: ICD-10-CM

## 2024-06-12 DIAGNOSIS — R42 LIGHTHEADED: ICD-10-CM

## 2024-06-12 LAB
ALBUMIN SERPL BCP-MCNC: 4.2 G/DL (ref 3.5–5.2)
ALP SERPL-CCNC: 42 U/L (ref 55–135)
ALT SERPL W/O P-5'-P-CCNC: 14 U/L (ref 10–44)
ANION GAP SERPL CALC-SCNC: 13 MMOL/L (ref 8–16)
AST SERPL-CCNC: 19 U/L (ref 10–40)
B-HCG UR QL: NEGATIVE
BASOPHILS # BLD AUTO: 0.05 K/UL (ref 0–0.2)
BASOPHILS NFR BLD: 0.7 % (ref 0–1.9)
BILIRUB SERPL-MCNC: 0.3 MG/DL (ref 0.1–1)
BUN SERPL-MCNC: 13 MG/DL (ref 6–20)
CALCIUM SERPL-MCNC: 10.1 MG/DL (ref 8.7–10.5)
CHLORIDE SERPL-SCNC: 104 MMOL/L (ref 95–110)
CHOLEST SERPL-MCNC: 281 MG/DL (ref 120–199)
CHOLEST/HDLC SERPL: 2.3 {RATIO} (ref 2–5)
CO2 SERPL-SCNC: 19 MMOL/L (ref 23–29)
CREAT SERPL-MCNC: 0.9 MG/DL (ref 0.5–1.4)
CTP QC/QA: YES
DIFFERENTIAL METHOD BLD: NORMAL
EOSINOPHIL # BLD AUTO: 0.1 K/UL (ref 0–0.5)
EOSINOPHIL NFR BLD: 1.7 % (ref 0–8)
ERYTHROCYTE [DISTWIDTH] IN BLOOD BY AUTOMATED COUNT: 13 % (ref 11.5–14.5)
EST. GFR  (NO RACE VARIABLE): >60 ML/MIN/1.73 M^2
GLUCOSE SERPL-MCNC: 93 MG/DL (ref 70–110)
HCT VFR BLD AUTO: 43.3 % (ref 37–48.5)
HDLC SERPL-MCNC: 120 MG/DL (ref 40–75)
HDLC SERPL: 42.7 % (ref 20–50)
HGB BLD-MCNC: 14.4 G/DL (ref 12–16)
IMM GRANULOCYTES # BLD AUTO: 0.01 K/UL (ref 0–0.04)
IMM GRANULOCYTES NFR BLD AUTO: 0.1 % (ref 0–0.5)
INR PPP: 0.9 (ref 0.8–1.2)
LDLC SERPL CALC-MCNC: 139.2 MG/DL (ref 63–159)
LYMPHOCYTES # BLD AUTO: 1.8 K/UL (ref 1–4.8)
LYMPHOCYTES NFR BLD: 23.8 % (ref 18–48)
MCH RBC QN AUTO: 30.4 PG (ref 27–31)
MCHC RBC AUTO-ENTMCNC: 33.3 G/DL (ref 32–36)
MCV RBC AUTO: 91 FL (ref 82–98)
MONOCYTES # BLD AUTO: 0.4 K/UL (ref 0.3–1)
MONOCYTES NFR BLD: 5.3 % (ref 4–15)
NEUTROPHILS # BLD AUTO: 5.1 K/UL (ref 1.8–7.7)
NEUTROPHILS NFR BLD: 68.4 % (ref 38–73)
NONHDLC SERPL-MCNC: 161 MG/DL
NRBC BLD-RTO: 0 /100 WBC
PLATELET # BLD AUTO: 311 K/UL (ref 150–450)
PMV BLD AUTO: 9.5 FL (ref 9.2–12.9)
POC PTINR: 1 (ref 0.9–1.2)
POCT GLUCOSE: 98 MG/DL (ref 70–110)
POTASSIUM SERPL-SCNC: 3.9 MMOL/L (ref 3.5–5.1)
PROT SERPL-MCNC: 8.1 G/DL (ref 6–8.4)
PROTHROMBIN TIME: 10 SEC (ref 9–12.5)
RBC # BLD AUTO: 4.74 M/UL (ref 4–5.4)
SAMPLE: NORMAL
SODIUM SERPL-SCNC: 136 MMOL/L (ref 136–145)
TRIGL SERPL-MCNC: 109 MG/DL (ref 30–150)
TSH SERPL DL<=0.005 MIU/L-ACNC: 1.55 UIU/ML (ref 0.4–4)
WBC # BLD AUTO: 7.49 K/UL (ref 3.9–12.7)

## 2024-06-12 PROCEDURE — 94760 N-INVAS EAR/PLS OXIMETRY 1: CPT | Mod: XB

## 2024-06-12 PROCEDURE — 93005 ELECTROCARDIOGRAM TRACING: CPT

## 2024-06-12 PROCEDURE — 85610 PROTHROMBIN TIME: CPT | Performed by: EMERGENCY MEDICINE

## 2024-06-12 PROCEDURE — 25000003 PHARM REV CODE 250: Performed by: EMERGENCY MEDICINE

## 2024-06-12 PROCEDURE — 84443 ASSAY THYROID STIM HORMONE: CPT | Performed by: EMERGENCY MEDICINE

## 2024-06-12 PROCEDURE — 81025 URINE PREGNANCY TEST: CPT | Performed by: EMERGENCY MEDICINE

## 2024-06-12 PROCEDURE — 99204 OFFICE O/P NEW MOD 45 MIN: CPT | Mod: GT,,, | Performed by: PSYCHIATRY & NEUROLOGY

## 2024-06-12 PROCEDURE — 99900035 HC TECH TIME PER 15 MIN (STAT)

## 2024-06-12 PROCEDURE — 94761 N-INVAS EAR/PLS OXIMETRY MLT: CPT

## 2024-06-12 PROCEDURE — 85610 PROTHROMBIN TIME: CPT

## 2024-06-12 PROCEDURE — 93010 ELECTROCARDIOGRAM REPORT: CPT | Mod: ,,, | Performed by: GENERAL PRACTICE

## 2024-06-12 PROCEDURE — 80061 LIPID PANEL: CPT | Performed by: EMERGENCY MEDICINE

## 2024-06-12 PROCEDURE — 99285 EMERGENCY DEPT VISIT HI MDM: CPT | Mod: 25

## 2024-06-12 PROCEDURE — 80053 COMPREHEN METABOLIC PANEL: CPT | Performed by: PHYSICIAN ASSISTANT

## 2024-06-12 PROCEDURE — 85025 COMPLETE CBC W/AUTO DIFF WBC: CPT | Performed by: EMERGENCY MEDICINE

## 2024-06-12 RX ORDER — MECLIZINE HYDROCHLORIDE 25 MG/1
25 TABLET ORAL
Status: COMPLETED | OUTPATIENT
Start: 2024-06-12 | End: 2024-06-12

## 2024-06-12 RX ORDER — ASPIRIN 325 MG
325 TABLET, DELAYED RELEASE (ENTERIC COATED) ORAL
Status: COMPLETED | OUTPATIENT
Start: 2024-06-12 | End: 2024-06-12

## 2024-06-12 RX ADMIN — MECLIZINE HYDROCHLORIDE 25 MG: 25 TABLET ORAL at 07:06

## 2024-06-12 RX ADMIN — ASPIRIN 325 MG: 325 TABLET, COATED ORAL at 07:06

## 2024-06-12 NOTE — ED PROVIDER NOTES
Encounter Date: 6/12/2024       History     Chief Complaint   Patient presents with    Dizziness     States she was at work and felt lightheaded and states she felt faint and states when she turns she feels like she may fall      Patient is a 29-year-old female who presents the emergency room for evaluation of an episode of dizziness that occurred while she was working.  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  She also had some blurry vision in her left eye but no vision loss or diplopia.  She would some twitching in the left eyelids.  She denied any unilateral focal weakness or numbness slurred speech.  She states the ocular symptoms resolved just after a few minutes and are not present currently.  She still feels dizzy but not as much.  Her father has a history of vertigo but her sister has a history of a brain tumor.      Review of patient's allergies indicates:  No Known Allergies  No past medical history on file.  Past Surgical History:   Procedure Laterality Date    COLONOSCOPY N/A 3/5/2024    Procedure: COLONOSCOPY;  Surgeon: Rivera Nava III, MD;  Location: [facility];  Service: Endoscopy;  Laterality: N/A;    ESOPHAGOGASTRODUODENOSCOPY N/A 3/5/2024    Procedure: EGD (ESOPHAGOGASTRODUODENOSCOPY);  Surgeon: Rivera Nava III, MD;  Location: [facility];  Service: Endoscopy;  Laterality: N/A;     Family History   Problem Relation Name Age of Onset    Rheum arthritis Mother      Hypertension Mother      Iron deficiency Mother      Hyperlipidemia Mother      Liver disease Father      Hyperlipidemia Father      Neuropathy Father      GI problems Brother      Kidney disease Maternal Grandmother      Alzheimer's disease Maternal Grandfather      GI problems Paternal Grandmother      Diabetes Paternal Grandmother      Stroke Paternal Grandmother      Heart attack Paternal Grandmother      Lymphoma Paternal Grandfather        Social History     Tobacco Use    Smoking status: Never    Smokeless tobacco: Never   Substance Use Topics    Alcohol use: Yes     Alcohol/week: 1.0 standard drink of alcohol     Types: 1 Shots of liquor per week    Drug use: Never     Review of Systems   Constitutional:  Negative for chills, fatigue and fever.   HENT:  Negative for congestion, ear pain, rhinorrhea and sore throat.    Eyes:  Positive for visual disturbance. Negative for pain, discharge, redness and itching.   Respiratory:  Negative for cough, choking, shortness of breath, wheezing and stridor.    Cardiovascular:  Negative for chest pain.   Gastrointestinal:  Negative for abdominal pain, diarrhea, nausea and vomiting.   Genitourinary:  Negative for difficulty urinating.   Musculoskeletal:  Negative for arthralgias.   Skin:  Negative for rash.   Neurological:  Positive for dizziness, light-headedness and headaches. Negative for tremors, syncope, weakness and numbness.   All other systems reviewed and are negative.      Physical Exam     Initial Vitals   BP Pulse Resp Temp SpO2   06/12/24 1604 06/12/24 1604 06/12/24 1604 06/12/24 1605 06/12/24 1604   131/85 83 (!) 100 99 °F (37.2 °C) 100 %      MAP       --                Physical Exam    Nursing note and vitals reviewed.  Constitutional: She appears well-developed and well-nourished.  Non-toxic appearance. No distress.   HENT:   Head: Normocephalic and atraumatic.   Mouth/Throat: Oropharynx is clear and moist.   Eyes: Conjunctivae and EOM are normal. Pupils are equal, round, and reactive to light.   Mild horizontal nystagmus lap and not to the right.  No dysconjugate gaze.  No vertical nystagmus.   Neck: Neck supple.   Normal range of motion.  Cardiovascular:  Normal rate, regular rhythm, normal heart sounds and intact distal pulses.     Exam reveals no gallop and no friction rub.       No murmur heard.  Pulmonary/Chest: Breath sounds normal. No respiratory distress. She has no decreased breath  sounds. She has no wheezes. She has no rhonchi. She has no rales.   Abdominal: Abdomen is soft. Bowel sounds are normal. She exhibits no distension. There is no abdominal tenderness.   Musculoskeletal:         General: No edema. Normal range of motion.      Cervical back: Normal range of motion and neck supple.     Neurological: She is alert and oriented to person, place, and time. She has normal strength. No cranial nerve deficit or sensory deficit. GCS score is 15. GCS eye subscore is 4. GCS verbal subscore is 5. GCS motor subscore is 6.   Finger-to-nose intact heel-to-shin intact   Skin: Skin is warm and dry. No rash noted.   Psychiatric: She has a normal mood and affect.         ED Course   Procedures  Labs Reviewed   CBC W/ AUTO DIFFERENTIAL   COMPREHENSIVE METABOLIC PANEL   PREGNANCY TEST, URINE RAPID   CBC W/ AUTO DIFFERENTIAL   PROTIME-INR   TSH   LIPID PANEL   POCT URINE PREGNANCY   POCT GLUCOSE MONITORING CONTINUOUS   POCT PROTIME-INR          Imaging Results    None          Medications - No data to display  Medical Decision Making  Amount and/or Complexity of Data Reviewed  Labs: ordered.  Radiology: ordered.    Risk  OTC drugs.      Additional MDM:     NIH Stroke Scale:   Interval = baseline (upon arrival/admit)  Level of consciousness = 0 - alert  LOC questions = 0 - answers both correctly  LOC commands = 0 - performs both correctly  Best gaze = 0 - normal  Visual = 0 - no visual loss  Facial palsy = 0 - normal  Motor left arm =  0 - no drift  Motor right arm =  0 - no drift  Motor left leg = 0 - no drift  Motor right leg =  0 - no drift  Limb ataxia = 0 - absent  Sensory = 0 - normal  Best language = 0 - no aphasia  Dysarthria = 0 - normal articulation  Extinction and inattention = 0 - no neglect  NIH Stroke Scale Total = 0              ED Course as of 06/12/24 2235 Wed Jun 12, 2024 1819 I signed up for the patient in the waiting room anticipating to see the patient as soon as she was able to get  back.  There were no beds and I proceeded to call her from the waiting room and see her in triage. She was seen by teletriage before me.     [JS]   1903 I spoke to the neurologist who thinks this is likely peripheral vertigo and possibly a vestibular migraine.  She recommends MRI of the brain without contrast.  Awaiting labs.  CTA head neck shows no signs of LVH. [JS]   1907 EKG independently interpreted by me as rate 65 normal sinus rate rhythm axis and intervals no ST segment elevation or depression. [JS]   2017 Awaiting MRI [JS]   2139 Thankfully MRI of the brain is normal.  I believe the patient has peripheral vertigo.  I will notify her regarding her hypercholesterolemia and she needs to follow up with primary care.  She can take ibuprofen and meclizine as needed for potential vestibular migraine with peripheral vertigo [JS]      ED Course User Index  [JS] Juan Gomez MD                           Clinical Impression:  Final diagnoses:  [R42] Lightheaded  [R29.818] Acute focal neurological deficit                 Juan Gomez MD  06/12/24 8258

## 2024-06-12 NOTE — FIRST PROVIDER EVALUATION
Emergency Department TeleTriage Encounter Note      CHIEF COMPLAINT    Chief Complaint   Patient presents with    Dizziness     States she was at work and felt lightheaded and states she felt faint and states when she turns she feels like she may fall        VITAL SIGNS   Initial Vitals   BP Pulse Resp Temp SpO2   06/12/24 1604 06/12/24 1604 06/12/24 1604 06/12/24 1605 06/12/24 1604   131/85 83 (!) 100 99 °F (37.2 °C) 100 %      MAP       --                   ALLERGIES    Review of patient's allergies indicates:  No Known Allergies    PROVIDER TRIAGE NOTE  This is a teletriage evaluation of a 29 y.o. female presenting to the ED complaining of near syncope. Patient reports feeling lightheaded and dizzy since yesterday. Symptoms are worst with standing. LMP was one month ago. She denies heavy bleeding.    Patient is alert and oriented. She speaks in complete sentences. She is sitting upright in the chair in no distress.     Initial orders will be placed and care will be transferred to an alternate provider when patient is roomed for a full evaluation. Any additional orders and the final disposition will be determined by that provider.         ORDERS  Labs Reviewed   CBC W/ AUTO DIFFERENTIAL   COMPREHENSIVE METABOLIC PANEL   PREGNANCY TEST, URINE RAPID   POCT GLUCOSE MONITORING CONTINUOUS       ED Orders (720h ago, onward)      Start Ordered     Status Ordering Provider    06/12/24 1609 06/12/24 1608  Pregnancy, urine rapid  STAT         Ordered JO-ANN MEJIA.    06/12/24 1608 06/12/24 1607  CBC auto differential  STAT         Pending Collection JO-ANN MEJIA.    06/12/24 1608 06/12/24 1607  Comprehensive metabolic panel  STAT         Pending Collection JO-ANN MEJIA.    06/12/24 1608 06/12/24 1607  Insert Saline lock IV  Once         Ordered JO-ANN MEJIA.    06/12/24 1608 06/12/24 1607  EKG 12-lead  Once         Ordered JO-ANN MEJIA    06/12/24 1608 06/12/24 1607  POCT glucose  Once         Ordered ROBERTO  JO-ANN YANG    06/12/24 1608 06/12/24 1607  Orthostatic blood pressure  Once         Ordered JO-ANN MEJIA              Virtual Visit Note: The provider triage portion of this emergency department evaluation and documentation was performed via [employer], a HIPAA-compliant telemedicine application, in concert with a tele-presenter in the room. A face to face patient evaluation with one of my colleagues will occur once the patient is placed in an emergency department room.      DISCLAIMER: This note was prepared with [employer] voice recognition transcription software. Garbled syntax, mangled pronouns, and other bizarre constructions may be attributed to that software system.

## 2024-06-13 VITALS
HEART RATE: 78 BPM | WEIGHT: 125 LBS | RESPIRATION RATE: 18 BRPM | BODY MASS INDEX: 23 KG/M2 | TEMPERATURE: 98 F | OXYGEN SATURATION: 100 % | SYSTOLIC BLOOD PRESSURE: 111 MMHG | DIASTOLIC BLOOD PRESSURE: 58 MMHG | HEIGHT: 62 IN

## 2024-06-13 NOTE — TELEMEDICINE CONSULT
"[facility]  Vascular Neurology  Comprehensive Stroke Center  TeleVascular Neurology Acute Consultation Note        Consult Information  Consults    Consulting Provider: CATHY MCNAMARA   Current Providers  No providers found    Patient Location:  [facility] EMERGENCY DEPARTMENT Emergency Department    Spoke hospital nurse at bedside with patient assisting consultant.  Patient information was obtained from patient.       Stroke Documentation  Acute Stroke Times   Last Known Normal Date: 06/12/24  Last Known Normal Time: 1330  Stroke Team Called Date: 06/12/24  Stroke Team Called Time: 1826  Stroke Team Arrival Date: 06/12/24  Stroke Team Arrival Time: 1826  CT Interpretation Time: 1845  Thrombolytic Therapy Recommended: No  Thrombectomy Recommended: No    NIH Scale:  1a. Level of Consciousness: 0-->Alert, keenly responsive  1b. LOC Questions: 0-->Answers both questions correctly  1c. LOC Commands: 0-->Performs both tasks correctly  2. Best Gaze: 0-->Normal  3. Visual: 0-->No visual loss  4. Facial Palsy: 0-->Normal symmetrical movements  5a. Motor Arm, Left: 0-->No drift, limb holds 90 (or 45) degrees for full 10 secs  5b. Motor Arm, Right: 0-->No drift, limb holds 90 (or 45) degrees for full 10 secs  6a. Motor Leg, Left: 0-->No drift, leg holds 30 degree position for full 5 secs  6b. Motor Leg, Right: 0-->No drift, leg holds 30 degree position for full 5 secs  7. Limb Ataxia: 0-->Absent  8. Sensory: 0-->Normal, no sensory loss  9. Best Language: 0-->No aphasia, normal  10. Dysarthria: 0-->Normal  11. Extinction and Inattention (formerly Neglect): 0-->No abnormality  Total (NIH Stroke Scale): 0      Modified [place]:    [place] Coma Scale:     ABCD2 Score:    [id number] Score:    HAS -BLED Score:    ICH Score:    Hunt & Monique Classification:      Blood pressure 115/84, pulse 76, temperature 98.1 °F (36.7 °C), temperature source Oral, resp. rate 18, height 5' 2" (1.575 m), weight 56.7 kg (125 lb), " SpO2 97%.    Van Negative  In your opinion, this was a: Tier 2     Medical Decision Making  HPI:  29 y.o. female with no sig PMH, currently on OCP, presenting with dizziness, visual disturbance, and HA.  Patient was at work when she started to feel lightheaded.  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  She then developed blurry vision in L eye.  On arrival to ED her dizziness became worse (more vertiginous) and she developed L sided headache.     Images personally reviewed and interpreted:  Study: Head CT and CTA Head & Neck  Study Interpretation: no acute findings     Assessment and plan:  28 y/o woman with no vascular RF except OCP use, presenting with lightheadedness, blurry vision, now HA.  Suspect basilar migraine, however given that patient has no h/o migraines recommend MRI brain to r/o more serious pathology.  No deficits on exam at this time.    Lytics recommendation: Thrombolytic therapy not recommended due to Suspected stroke mimic  and Patient back to neurological baseline  Thrombectomy recommendation: No; at this time symptoms not suggestive of large vessel occlusion  Placement recommendation: pending further studies               ROS  Physical Exam  Neurological:      General: No focal deficit present.      Mental Status: She is oriented to person, place, and time.      Cranial Nerves: No cranial nerve deficit.      Sensory: No sensory deficit.      Motor: No weakness.      Coordination: Coordination normal.      Comments: No nystagmus or gaze palsy       No past medical history on file.  Past Surgical History:   Procedure Laterality Date    COLONOSCOPY N/A 3/5/2024    Procedure: COLONOSCOPY;  Surgeon: Rivera Nava III, MD;  Location: [facility];  Service: Endoscopy;  Laterality: N/A;    ESOPHAGOGASTRODUODENOSCOPY N/A 3/5/2024    Procedure: EGD (ESOPHAGOGASTRODUODENOSCOPY);  Surgeon: Rivera Nava III, MD;  Location: [facility];  Service:  Endoscopy;  Laterality: N/A;     Family History   Problem Relation Name Age of Onset    Rheum arthritis Mother      Hypertension Mother      Iron deficiency Mother      Hyperlipidemia Mother      Liver disease Father      Hyperlipidemia Father      Neuropathy Father      GI problems Brother      Kidney disease Maternal Grandmother      Alzheimer's disease Maternal Grandfather      GI problems Paternal Grandmother      Diabetes Paternal Grandmother      Stroke Paternal Grandmother      Heart attack Paternal Grandmother      Lymphoma Paternal Grandfather         Diagnoses  Problem Noted   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  Immediate/timely evaluation by a highly prepared expert is paramount for optimal outcomes  High risk for neurological deterioration if not properly diagnosed  High risk for neurological deterioration if not treated promplty/as soon as possible  Complex diagnostic evaluation may be required (advanced imaging)  High risk treatment options (thrombolytics and/or thrombectomy)    Patient care was coordinated with spoke provider, including but not limted to    Discussing likely diagnosis/etiology of symptoms  Making recommendations for further diagnostic studies  Making recommendations for intravenous thrombolytics or other advanced therapies  Making recommendations for disposition (admission/transfer for higher level of care)

## 2024-06-13 NOTE — SUBJECTIVE & OBJECTIVE
HPI:  29 y.o. female with no sig PMH, currently on OCP, presenting with dizziness, visual disturbance, and HA.  Patient was at work when she started to feel lightheaded.  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  She then developed blurry vision in L eye.  On arrival to ED her dizziness became worse (more vertiginous) and she developed L sided headache.     Images personally reviewed and interpreted:  Study: Head CT and CTA Head & Neck  Study Interpretation: no acute findings     Assessment and plan:  28 y/o woman with no vascular RF except OCP use, presenting with lightheadedness, blurry vision, now HA.  Suspect basilar migraine, however given that patient has no h/o migraines recommend MRI brain to r/o more serious pathology.  No deficits on exam at this time.    Lytics recommendation: Thrombolytic therapy not recommended due to Suspected stroke mimic  and Patient back to neurological baseline  Thrombectomy recommendation: No; at this time symptoms not suggestive of large vessel occlusion  Placement recommendation: pending further studies

## 2024-06-13 NOTE — DISCHARGE INSTRUCTIONS
Thankfully the CT head and MRI did not show any evidence of acute stroke or brain tumor.  The neurologist believes she may have had a basilar migraine causing her vertigo.  You can take Tylenol and ibuprofen for symptoms as well as over-the-counter meclizine.  You also had high cholesterol levels and you need to follow up with your regular doctor.  At this point in time I think would be best to stop taking your birth control until told otherwise by the neurologist.

## 2024-06-17 DIAGNOSIS — F33.0 MILD EPISODE OF RECURRENT MAJOR DEPRESSIVE DISORDER: ICD-10-CM

## 2024-06-17 RX ORDER — SERTRALINE HYDROCHLORIDE 50 MG/1
TABLET, FILM COATED ORAL
Qty: 180 TABLET | Refills: 3 | Status: SHIPPED | OUTPATIENT
Start: 2024-06-17

## 2024-06-17 NOTE — TELEPHONE ENCOUNTER
Refill Decision Note   Tram [facility] Frank John  is requesting a refill authorization.  Brief Assessment and Rationale for Refill:  Approve     Medication Therapy Plan:  EDv 6/12/24 for Basilar migraine, no change to current sertraline therapy during encounter.      Extended chart review required: Yes   Comments:     Note composed:6:38 PM 06/17/2024

## 2024-06-17 NOTE — TELEPHONE ENCOUNTER
No care due was identified.  Health [facility] Embedded Care Due Messages. Reference number: [id number].   6/17/2024 1:48:31 PM CDT

## 2024-06-18 NOTE — PROGRESS NOTES
New Patient     SUBJECTIVE:  Patient ID: Tram Mindy Frank John   MRN: [id number]  Referred By: Dr. Juan Gomez  Chief Complaint: No chief complaint on file.      History of Present Illness:   29 y.o. female with chronic tension headaches, depression, and hyperlipidemia, who presents to clinic alone for evaluation of headaches. 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 She has managed these with massage, dry needling, and occasional ibuprofen. Recent events include:   January 2024 - quick stabbing pain behind L ear x5 seconds... again 2 weeks later. No further events.     Tuesday (6/11/24) - mild light-headedness but thought s/t tirdness/stress  Wednesday (6/12) - stood up to see patient at 1:15pm.. she began feeling lightheaded/dizziness (BP normal - checked at workplace) - throughout procedure of root canal, pt kept turning head and symptoms worsened- Left eye blurry vision and nystagmus; worsened symptoms over time, and by the end of the work day, she was holding onto the wall to prevent from falling; resolve of symptoms at 7-8 pm after meclizine given. Only mild head pain (pressure) on left side once patient got to ER after hours of symptoms. MRI brain and CTA done, both negative. EKG NSR. Cbg 98, tsh 1.5, UPT negative; Suspected vestibular migraine with peripheral vertigo    Father has vertigo and sister has history of brain tumor (ganglia).    Taking Drospirenone and ethinyl estradiol 3mg/0.02 - 8-9years. Started taking zoloft 50mg in February 2024. Reports leaving her zoloft at home for wedding a week prior to this inicident and missed one week of medication.     PMHx negative for TBI, Meningitis, Aneurysms, Kidney Stones, asthma, GI bleed, osteoporosis, CAD/MI, CVA/TIA, DM, cancer, pregnancy     Family Hx negative for Migraines       Treatments Tried    Tylenol  Ibuprofen   Zoloft       Social History  Alcohol - denies  Smoke - denies  Recreational Drug Use- denies    Current Medications:    Current Outpatient Medications:     albuterol (PROVENTIL/VENTOLIN HFA) 90 mcg/actuation inhaler, , Disp: , Rfl:     famotidine (PEPCID) 20 MG tablet, Take 1 tablet (20 mg total) by mouth 2 (two) times daily., Disp: 180 tablet, Rfl: 2    ondansetron (ZOFRAN-ODT) 4 MG TbDL, Take 1 tablet (4 mg total) by mouth every 12 (twelve) hours as needed (nausea)., Disp: 60 tablet, Rfl: 0    pantoprazole (PROTONIX) 40 MG tablet, Take 1 tablet (40 mg total) by mouth once daily., Disp: 90 tablet, Rfl: 1    sertraline (ZOLOFT) 50 MG tablet, TAKE 2 TABLETS BY MOUTH DAILY, Disp: 180 tablet, Rfl: 3    valACYclovir (VALTREX) 1000 MG tablet, Take 2 tablets by mouth every 12 (twelve) hours., Disp: , Rfl:     Review of Systems - as per HPI, otherwise a balanced 10 systems review is negative.    OBJECTIVE:  Vitals:  There were no vitals taken for this visit.    Physical Exam   Constitutional: she appears well-developed and well-nourished. she is well groomed. NAD  HENT:    Head: Normocephalic and atraumatic, Frontalis was NTTP, temporalis was NTTP   Eyes: Conjunctivae and EOM are normal. Pupils are equal, round, and reactive to light   Neck: Neck supple. Occiput and trapezius NTTP   Musculoskeletal: Normal range of motion. No joint stiffness. No vertebral point tenderness.  Skin: Skin is warm and dry.  Psychiatric: Normal mood and affect.     Neuro exam:    Mental status:  The patient is alert and oriented to person, place and time.  Language is intact and fluent  Remote and recent memory are intact  Normal attention and concentration  Mood is stable    Cranial Nerves:  Fundoscopic examination does not reveal any occult papilledema.    Pupils are equal and reactive to light.    Extraocular movements are intact and without nystagmus.    Visual fields are full to confrontation testing.   Facial  movement is symmetric.  Facial sensation is intact.    Hearing is intact   FROM of neck in all (6) directions without pain  Shoulder shrug symmetrical.    Coordination:     Finger to nose - normal and symmetric bilaterally   Heel to shin test - normal and symmetric bilaterally     Motor:  Normal muscle bulk and symmetry. No fasciculations were noted.   Tremor not apparent   Pronator drift not apparent.    strength was strong and symmetric  Finger extension strength was strong and symmetric  RUE:appropriate against gravity and medium force as tested 5/5  LUE: appropriate against gravity and medium force as tested 5/5  RLE:appropriate against gravity and medium force as tested 5/5              LLE: appropriate against gravity and medium force as tested 5/5    Reflexes:  Right Brachioradialis 2+  Left Brachioradialis 2+  Right Patellar2+  Left Patellar 2+      Sensory:  RUE  intact light touch  LUE intact light touch  RLE intact light touch  LLE intact light touch    Gait:   Romberg - negative  Normal gait  Tandem, Heel, and Toe Walk - able to perform without difficulty    Review of Data:   Notes from ER reviewed   Labs:  Admission on 06/12/2024, Discharged on 06/12/2024   Component Date Value Ref Range Status    Sodium 06/12/2024 136  136 - 145 mmol/L Final    Potassium 06/12/2024 3.9  3.5 - 5.1 mmol/L Final    Chloride 06/12/2024 104  95 - 110 mmol/L Final    CO2 06/12/2024 19 (L)  23 - 29 mmol/L Final    Glucose 06/12/2024 93  70 - 110 mg/dL Final    BUN 06/12/2024 13  6 - 20 mg/dL Final    Creatinine 06/12/2024 0.9  0.5 - 1.4 mg/dL Final    Calcium 06/12/2024 10.1  8.7 - 10.5 mg/dL Final    Total Protein 06/12/2024 8.1  6.0 - 8.4 g/dL Final    Albumin 06/12/2024 4.2  3.5 - 5.2 g/dL Final    Total Bilirubin 06/12/2024 0.3  0.1 - 1.0 mg/dL Final    Alkaline Phosphatase 06/12/2024 42 (L)  55 - 135 U/L Final    AST 06/12/2024 19  10 - 40 U/L Final    ALT 06/12/2024 14  10 - 44 U/L Final    eGFR 06/12/2024 >60  >60  mL/min/1.73 m^2 Final    Anion Gap 06/12/2024 13  8 - 16 mmol/L Final    WBC 06/12/2024 7.49  3.90 - 12.70 K/uL Final    RBC 06/12/2024 4.74  4.00 - 5.40 M/uL Final    Hemoglobin 06/12/2024 14.4  12.0 - 16.0 g/dL Final    Hematocrit 06/12/2024 43.3  37.0 - 48.5 % Final    MCV 06/12/2024 91  82 - 98 fL Final    MCH 06/12/2024 30.4  27.0 - 31.0 pg Final    MCHC 06/12/2024 33.3  32.0 - 36.0 g/dL Final    RDW 06/12/2024 13.0  11.5 - 14.5 % Final    Platelets 06/12/2024 311  150 - 450 K/uL Final    MPV 06/12/2024 9.5  9.2 - 12.9 fL Final    Immature Granulocytes 06/12/2024 0.1  0.0 - 0.5 % Final    Gran # (ANC) 06/12/2024 5.1  1.8 - 7.7 K/uL Final    Immature Grans (Abs) 06/12/2024 0.01  0.00 - 0.04 K/uL Final    Lymph # 06/12/2024 1.8  1.0 - 4.8 K/uL Final    Mono # 06/12/2024 0.4  0.3 - 1.0 K/uL Final    Eos # 06/12/2024 0.1  0.0 - 0.5 K/uL Final    Baso # 06/12/2024 0.05  0.00 - 0.20 K/uL Final    nRBC 06/12/2024 0  0 /100 WBC Final    Gran % 06/12/2024 68.4  38.0 - 73.0 % Final    Lymph % 06/12/2024 23.8  18.0 - 48.0 % Final    Mono % 06/12/2024 5.3  4.0 - 15.0 % Final    Eosinophil % 06/12/2024 1.7  0.0 - 8.0 % Final    Basophil % 06/12/2024 0.7  0.0 - 1.9 % Final    Differential Method 06/12/2024 Automated   Final    Prothrombin Time 06/12/2024 10.0  9.0 - 12.5 sec Final    INR 06/12/2024 0.9  0.8 - 1.2 Final    TSH 06/12/2024 1.550  0.400 - 4.000 uIU/mL Final    Cholesterol 06/12/2024 281 (H)  120 - 199 mg/dL Final    Triglycerides 06/12/2024 109  30 - 150 mg/dL Final    HDL 06/12/2024 120 (H)  40 - 75 mg/dL Final    LDL Cholesterol 06/12/2024 139.2  63.0 - 159.0 mg/dL Final    HDL/Cholesterol Ratio 06/12/2024 42.7  20.0 - 50.0 % Final    Total Cholesterol/HDL Ratio 06/12/2024 2.3  2.0 - 5.0 Final    Non-HDL Cholesterol 06/12/2024 161  mg/dL Final    POC Preg Test, Ur 06/12/2024 Negative  Negative Final     Acceptable 06/12/2024 Yes   Final    POC PTINR 06/12/2024 1.0  0.9 - 1.2 Final    Sample  06/12/2024 unknown   Final    POCT Glucose 06/12/2024 98  70 - 110 mg/dL Final   Office Visit on 04/08/2024   Component Date Value Ref Range Status    Final Pathologic Diagnosis 04/08/2024    Final                    Value:Specimen Adequacy  Satisfactory for interpretation. Endocervical component is absent.    [place] Category  Negative for intraepithelial lesion or malignancy.  Blood present.  Sparsely cellular specimen.      Disclaimer 04/08/2024    Final                    Value:The Pap smear is a screening test that aids in the detection of cervical cancer and cancer precursors. Both false positive and false negative results can occur. The test should be used at regular intervals, and positive results should be confirmed before   definitive therapy.  This liquid based specimen is processed using the  or [id number] Thin PrepPAP System. This specimen has been analyzed by the ThinPrep Imaging System ([employer]DM[employer]AME), an automated imaging and review system which assists the laboratory in evaluating   cells on ThinPrep PAP tests. Following automated imaging, selected fields from every slide are reviewed by a cytotechnologist and/or pathologist.     Screening was performed at [facility], [address] [place] [id number].      HPV other High Risk types, PCR 04/08/2024 Negative  Negative Final    HPV High Risk type 16, PCR 04/08/2024 Negative  Negative Final    HPV High Risk type 18, PCR 04/08/2024 Negative  Negative Final     Imaging:  Results for orders placed or performed during the hospital encounter of 06/12/24   MRI Brain Without Contrast    Narrative    EXAM:  MRI BRAIN WITHOUT CONTRAST    CLINICAL HISTORY:  Dizziness, non-specific; .    COMPARISON:  CTA head and neck obtained at 18:24 hours on the same day    FINDINGS:  Intracranial contents:The study is mildly motion degraded.    There is no acute intracranial abnormality.  Brain volume, ventricular  size and position are normal.  There is no intracranial hemorrhage or mass/mass effect.  There are no regions of restricted diffusion to suggest acute infarction.  There are no definite regions of signal abnormality in the brain.  There is no hydrocephalus or midline shift.  There is no abnormal extra-axial fluid collection.  The basilar cisterns are open.  Flow voids indicating patency are present in the major vessels at the base of the brain.  The cerebellar tonsils are in normal position.  The sellar structures are normal.  The orbits are grossly normal.    Extracranial contents, calvarium, soft tissues: There is normal marrow signal intensity in the clivus and calvarium. The paranasal sinuses and mastoid air cells are grossly clear.  There is mild prominence of the palatine tonsils without focal mass and likely representing mild reactive lymphoid tissue      Impression    1. There is no acute abnormality.  There is no intracranial hemorrhage, mass/mass effect, acute edema or ischemia.  Note: Preliminary results were provided by Dr. Emerson Ng ([facility]).  There is no significant discrepancy.      Electronically signed by: Brian Yu MD  Date:    06/13/2024  Time:    06:52     Note: I have independently reviewed any/all imaging/labs/tests and agree with the report (s) as documented.  Any discrepancies will be as noted/demarcated by free text.  RENEE, SUEP-C 6/18/2024    ASSESSMENT:  No diagnosis found.      PLAN:  - Discussed symptoms appear to be consistent with serotonin withdrawal syndrome, discussed treatment options and patient agreed with the following plan:  - Symptoms not consistent with a unifying disorder, DDx includes BPPV  - encouraged patient to track headaches if they were to arise in the future.   - encouraged pt to discuss episode with PCP and monitor for subsequent symptoms.   - may continue to use meclizine for symptoms as prescribed by PCP  - alternative treatment options offered   -  importance of healthy diet, regular exercise and sleep hygiene in the treatment of headaches    - Start tracking headaches via Migraine Buddy alfonso on phone   - RTC as needed          I have discussed realistic goals of care with patient at length as well as medication options, and need for lifestyle adjustment. I have explained that treatment will take time. We have agreed that the goal will be to reduce frequency/intensity/quantity of HA, not to be completely HA free. I have explained my non narcotic policy regarding headache treatment.    Patient agreeable to work on lifestyle adjustments.    Discussed potential for teratogenicity with treatment, patient understands if her family planning status should change she will contact office immediately and we will safely adjust medications as needed.     Questions and concerns were sought and answered to the patient's stated verbal satisfaction.  The patient verbalizes understanding and agreement with the above stated treatment plan.     CC: Ameena Schafer MD Monique Smith, FNP-C  [facility]  [phone]    Dr. Perez was available during today's encounter.     100% of this 61 minute visit was spent face to face, and 50% or more of this visit included discussion of the treatment plan, symptom management, and coordination of care. Questions and concerns were sought and answered to the patient's stated verbal satisfaction.  The patient verbalizes understanding and agreement with the above stated treatment plan.

## 2024-06-19 ENCOUNTER — OFFICE VISIT (OUTPATIENT)
Dept: NEUROLOGY | Facility: CLINIC | Age: 29
End: 2024-06-19
Payer: COMMERCIAL

## 2024-06-19 DIAGNOSIS — R42 VERTIGO: ICD-10-CM

## 2024-06-19 DIAGNOSIS — R42 DIZZINESS: Primary | ICD-10-CM

## 2024-06-19 DIAGNOSIS — T43.205A SEROTONIN WITHDRAWAL SYNDROME, INITIAL ENCOUNTER: ICD-10-CM

## 2024-06-19 PROCEDURE — 99999 PR PBB SHADOW E&M-EST. PATIENT-LVL II: CPT | Mod: PBBFAC,,,

## 2024-06-20 LAB
OHS QRS DURATION: 84 MS
OHS QTC CALCULATION: 449 MS

## 2024-08-01 DIAGNOSIS — F33.0 MILD EPISODE OF RECURRENT MAJOR DEPRESSIVE DISORDER: ICD-10-CM

## 2024-08-02 ENCOUNTER — TELEPHONE (OUTPATIENT)
Dept: FAMILY MEDICINE | Facility: CLINIC | Age: 29
End: 2024-08-02
Payer: COMMERCIAL

## 2024-08-02 RX ORDER — SERTRALINE HYDROCHLORIDE 100 MG/1
100 TABLET, FILM COATED ORAL DAILY
Qty: 90 TABLET | Refills: 3 | Status: SHIPPED | OUTPATIENT
Start: 2024-08-02

## 2024-08-02 NOTE — TELEPHONE ENCOUNTER
No care due was identified.  [facility] Embedded Care Due Messages. Reference number: [id number].   8/01/2024 9:50:25 PM CDT

## 2024-08-19 ENCOUNTER — PATIENT MESSAGE (OUTPATIENT)
Dept: FAMILY MEDICINE | Facility: CLINIC | Age: 29
End: 2024-08-19
Payer: COMMERCIAL

## 2024-08-19 DIAGNOSIS — F33.0 MILD EPISODE OF RECURRENT MAJOR DEPRESSIVE DISORDER: ICD-10-CM

## 2024-08-19 RX ORDER — SERTRALINE HYDROCHLORIDE 100 MG/1
100 TABLET, FILM COATED ORAL DAILY
Qty: 90 TABLET | Refills: 3 | Status: SHIPPED | OUTPATIENT
Start: 2024-08-19

## 2024-08-19 NOTE — TELEPHONE ENCOUNTER
No care due was identified.  [facility] Embedded Care Due Messages. Reference number: [id number].   8/19/2024 9:31:59 AM CDT

## 2024-11-05 ENCOUNTER — OFFICE VISIT (OUTPATIENT)
Dept: FAMILY MEDICINE | Facility: CLINIC | Age: 29
End: 2024-11-05
Payer: COMMERCIAL

## 2024-11-05 ENCOUNTER — OFFICE VISIT (OUTPATIENT)
Dept: SURGERY | Facility: CLINIC | Age: 29
End: 2024-11-05
Payer: COMMERCIAL

## 2024-11-05 VITALS
BODY MASS INDEX: 25.72 KG/M2 | HEIGHT: 62 IN | OXYGEN SATURATION: 99 % | DIASTOLIC BLOOD PRESSURE: 68 MMHG | SYSTOLIC BLOOD PRESSURE: 110 MMHG | HEART RATE: 79 BPM | WEIGHT: 139.75 LBS

## 2024-11-05 DIAGNOSIS — E78.2 MIXED HYPERLIPIDEMIA: ICD-10-CM

## 2024-11-05 DIAGNOSIS — K62.5 BRIGHT RED BLOOD PER RECTUM: ICD-10-CM

## 2024-11-05 DIAGNOSIS — Z00.01 ENCOUNTER FOR GENERAL ADULT MEDICAL EXAMINATION WITH ABNORMAL FINDINGS: ICD-10-CM

## 2024-11-05 DIAGNOSIS — K62.5 BRIGHT RED BLOOD PER RECTUM: Primary | ICD-10-CM

## 2024-11-05 DIAGNOSIS — F33.0 MILD EPISODE OF RECURRENT MAJOR DEPRESSIVE DISORDER: ICD-10-CM

## 2024-11-05 DIAGNOSIS — J45.990 EXERCISE-INDUCED ASTHMA: ICD-10-CM

## 2024-11-05 DIAGNOSIS — R11.0 NAUSEA: ICD-10-CM

## 2024-11-05 PROBLEM — Z86.19 HISTORY OF HELICOBACTER PYLORI INFECTION: Status: ACTIVE | Noted: 2024-01-31

## 2024-11-05 PROCEDURE — 3044F HG A1C LEVEL LT 7.0%: CPT | Mod: CPTII,S$GLB,, | Performed by: FAMILY MEDICINE

## 2024-11-05 PROCEDURE — 3008F BODY MASS INDEX DOCD: CPT | Mod: CPTII,S$GLB,, | Performed by: FAMILY MEDICINE

## 2024-11-05 PROCEDURE — 3078F DIAST BP <80 MM HG: CPT | Mod: CPTII,S$GLB,, | Performed by: FAMILY MEDICINE

## 2024-11-05 PROCEDURE — 99214 OFFICE O/P EST MOD 30 MIN: CPT | Mod: S$GLB,,, | Performed by: FAMILY MEDICINE

## 2024-11-05 PROCEDURE — 3074F SYST BP LT 130 MM HG: CPT | Mod: CPTII,S$GLB,, | Performed by: FAMILY MEDICINE

## 2024-11-05 PROCEDURE — 99999 PR PBB SHADOW E&M-EST. PATIENT-LVL IV: CPT | Mod: PBBFAC,,, | Performed by: FAMILY MEDICINE

## 2024-11-05 PROCEDURE — 99999 PR PBB SHADOW E&M-EST. PATIENT-LVL III: CPT | Mod: PBBFAC,,, | Performed by: COLON & RECTAL SURGERY

## 2024-11-05 RX ORDER — ONDANSETRON 4 MG/1
4 TABLET, ORALLY DISINTEGRATING ORAL EVERY 12 HOURS PRN
Qty: 60 TABLET | Refills: 3 | Status: SHIPPED | OUTPATIENT
Start: 2024-11-05

## 2024-11-05 RX ORDER — ALBUTEROL SULFATE 90 UG/1
1-2 INHALANT RESPIRATORY (INHALATION) EVERY 4 HOURS PRN
Qty: 18 G | Refills: 11 | Status: SHIPPED | OUTPATIENT
Start: 2024-11-05

## 2024-11-05 RX ORDER — VENLAFAXINE HYDROCHLORIDE 37.5 MG/1
37.5 CAPSULE, EXTENDED RELEASE ORAL DAILY
Qty: 7 CAPSULE | Refills: 0 | Status: SHIPPED | OUTPATIENT
Start: 2024-11-05

## 2024-11-05 RX ORDER — VENLAFAXINE HYDROCHLORIDE 75 MG/1
75 CAPSULE, EXTENDED RELEASE ORAL DAILY
Qty: 90 CAPSULE | Refills: 1 | Status: SHIPPED | OUTPATIENT
Start: 2024-11-05

## 2024-11-05 NOTE — PROGRESS NOTES
Subjective:         Patient ID: Annelise John is a 29 y.o. female.    Chief Complaint: Rectal Bleeding    Patient Active Problem List   Diagnosis    Mild episode of recurrent major depressive disorder    Chronic diarrhea    History of Helicobacter pylori infection    Mixed hyperlipidemia    Basilar migraine    Exercise-induced asthma      CYNDEE Willis is a 29 y.o. female    History of Present Illness    CHIEF COMPLAINT:  Annelise presents today for blood in stool.    GASTROINTESTINAL SYMPTOMS:  She reports bright red blood in her stool for two weeks, first noticed on October 25th. The blood is visible during bowel movements, appearing as red streaks down the sides of the stool, droplets dissipating in the water, and on toilet paper when wiping. Blood was still present during her last bowel movement on Sunday. She denies any pain associated with bowel movements. She experienced constipation during a trip to [place] on October 18th, followed by nausea and feeling that her food was not digesting properly. After taking a stool softener and laxative, the constipation resolved. On October 25th, she experienced a sharp abdominal pain that made her feel like she was going to pass out. She describes ongoing intermittent stabbing abdominal pains that occur while lying in bed, not associated with bowel movements. These pains sometimes precede episodes of bright red blood in the stool by about an hour.    PREVIOUS GASTROINTESTINAL EVALUATIONS:  She has undergone both endoscopy and colonoscopy performed by a gastroenterologist. The results revealed a positive H. pylori test and a thin GI lining, which the physician noted could make her susceptible to future ulcers. Biopsies were taken from various parts of the GI tract, including the stomach and colon. The colon biopsy results were reported as normal, with random areas of the colon sampled.    MEDICATIONS:  She previously took antibiotics for H. pylori, which was confirmed by  "biopsy during an endoscopy and colonoscopy. She was advised to discontinue antacids and modify her diet to avoid spicy or irritating foods. She requests a refill for her albuterol inhaler due to experiencing exercise-induced asthma symptoms, particularly when running. She discontinued Zoloft due to an adverse event when she abruptly stopped taking it during travel, which resulted in an emergency room visit.    MENTAL HEALTH:  She reports a recurrence of depression symptoms after discontinuing Zoloft. She previously had a positive response to Zoloft in terms of mood improvement. However, she experienced significant weight gain of approximately 15 lbs while on the medication, attributing it to constant hunger. She denies trying any antidepressants prior to Zoloft. Her current depression feels as intense as when she initially sought treatment.    ASTHMA:  She reports a recurrence of exercise-induced asthma symptoms, experiencing difficulty breathing every time she runs. She uses an albuterol inhaler before air travel and for symptom management.               Objective:     Vitals:    11/05/24 1334   BP: 110/68   BP Location: Left arm   Patient Position: Sitting   Pulse: 79   SpO2: 99%   Weight: 63.4 kg (139 lb 12.4 oz)   Height: 5' 2" (1.575 m)         Physical Exam  Vitals and nursing note reviewed.   Constitutional:       General: She is not in acute distress.     Appearance: Normal appearance. She is not ill-appearing, toxic-appearing or diaphoretic.   HENT:      Head: Normocephalic and atraumatic.   Eyes:      General: No scleral icterus.     Conjunctiva/sclera: Conjunctivae normal.   Cardiovascular:      Rate and Rhythm: Normal rate.   Pulmonary:      Effort: Pulmonary effort is normal. No respiratory distress.   Skin:     Coloration: Skin is not pale.   Neurological:      Mental Status: She is alert. Mental status is at baseline.   Psychiatric:         Attention and Perception: Attention and perception normal.    "      Mood and Affect: Mood and affect normal.         Speech: Speech normal.         Behavior: Behavior normal.         Cognition and Memory: Cognition and memory normal.         Judgment: Judgment normal.         Assessment:       1. Bright red blood per rectum    2. Nausea    3. Mild episode of recurrent major depressive disorder    4. Encounter for general adult medical examination with abnormal findings    5. Mixed hyperlipidemia    6. Exercise-induced asthma          Plan:   Recent relevant labs results reviewed with patient.         Assessment & Plan    Evaluated rectal bleeding; atypical presentation due to lack of associated pain  Reviewed previous GI workup, including normal colonoscopy/endoscopy biopsies except for H. pylori  Will refer to colorectal surgery for further evaluation  Reassessed depression management; previous positive response to sertraline noted but with side effects  Opted to switch antidepressant to venlafaxine to potentially avoid weight gain side effect    RECTAL BLEEDING:  - Explained difference between gastroenterology and colorectal surgery specialties.  - Discussed that bright red blood usually indicates lower GI tract source.  - Tram to monitor for any changes in rectal bleeding symptoms.  - Recommend avoiding spicy foods or other GI irritants.  - Referred to colorectal surgery for evaluation of rectal bleeding.    DEPRESSION:  - Started venlafaxine 37.5 mg daily for 7 days, then increase to 75 mg daily for depression.  - E-visit scheduled in 8 weeks to assess response to venlafaxine.  - Follow up in 8 weeks via e-visit to assess response to venlafaxine.    EXERCISE-INDUCED ASTHMA:  - Refilled albuterol inhaler for exercise-induced asthma.    TRAVEL-RELATED INSTRUCTIONS:  - Informed about potential for emergency medication supply from pharmacists when traveling.  - Refilled Zofran (ondansetron) for use during air travel.    FOLLOW UP:  - Follow up in June 2024 for annual physical  exam.  - Contact the office if any worsening symptoms or concerns before next appointment.         1. Bright red blood per rectum  -     Ambulatory referral/consult to Colorectal Surgery; Future; Expected date: 11/12/2024    2. Nausea  -     ondansetron (ZOFRAN-ODT) 4 MG TbDL; Take 1 tablet (4 mg total) by mouth every 12 (twelve) hours as needed (nausea).  Dispense: 60 tablet; Refill: 3    3. Mild episode of recurrent major depressive disorder  -     venlafaxine (EFFEXOR-XR) 37.5 MG 24 hr capsule; Take 1 capsule (37.5 mg total) by mouth once daily.  Dispense: 7 capsule; Refill: 0  -     venlafaxine (EFFEXOR-XR) 75 MG 24 hr capsule; Take 1 capsule (75 mg total) by mouth once daily. After 1 week of 37.5 mg  Dispense: 90 capsule; Refill: 1  -     MYC E-Visit    4. Encounter for general adult medical examination with abnormal findings  -     Hepatic Function Panel; Future; Expected date: 11/05/2024  -     Renal Function Panel; Future; Expected date: 11/05/2024  -     Lipid Panel; Future; Expected date: 11/05/2024  -     TSH; Future; Expected date: 11/05/2024  -     Hemoglobin A1C; Future; Expected date: 11/05/2024  -     CBC Auto Differential; Future; Expected date: 11/05/2024    5. Mixed hyperlipidemia  -     Lipid Panel; Future; Expected date: 11/05/2024    6. Exercise-induced asthma  -     albuterol (PROVENTIL/VENTOLIN HFA) 90 mcg/actuation inhaler; Inhale 1-2 puffs into the lungs every 4 (four) hours as needed for Wheezing or Shortness of Breath. Rescue  Dispense: 18 g; Refill: 11        Patient's questions answered. Plan reviewed with patient at the end of visit. Relevant precautions to chief complaint and reasons to seek further medical care or to contact the office sooner reviewed with patient.     Follow up in about 7 months (around 6/1/2025) for Annual Exam, (prelabs).        Part of this note was dictated using voice recognition software. Please excuse any typographical errors.     This note was generated with  the assistance of ambient listening technology. Verbal consent was obtained by the patient and accompanying visitor(s) for the recording of patient appointment to facilitate this note. I attest to having reviewed and edited the generated note for accuracy, though some syntax or spelling errors may persist. Please contact the author of this note for any clarification.

## 2024-11-05 NOTE — PROGRESS NOTES
CRS Office Visit History and Physical    Referring Md:   Ameena Schafer Md  [address] [facility]  [place] [address]    SUBJECTIVE:     Chief Complaint:  Painless hematochezia    History of Present Illness:  The patient is a new patient to this practice.   Course is as follows:  Patient is a 29 y.o. female presents with painless hematochezia  She was doing well up until a week and a half ago where she developed acute onset right lower quadrant abdominal pain.  This was followed by painless bright red blood per rectum that has persisted over the past week.  She only has daily bowel movements.  Over the past week and a half, she has had bowel movements every 2-3 days.  She spends 1-2 minutes on the toilet for each bowel movement.  Denies any significant straining.  No protrusion.  She has right lower quadrant pain prior to defecation but does not have any pain with defecation.  No family history of colorectal cancer.  Brother with gastric ulcers.  No family history of inflammatory bowel disease.  No prior abdominal or anorectal surgeries.        Last Colonoscopy:  03/05/2024- normal ileum.  Normal colon.  Good prep    Review of patient's allergies indicates:   Allergen Reactions    Animal dander Itching and Rash    Grass pollen-elodia grass standard Itching and Rash       History reviewed. No pertinent past medical history.  Past Surgical History:   Procedure Laterality Date    COLONOSCOPY N/A 3/5/2024    Procedure: COLONOSCOPY;  Surgeon: Rivera Nava III, MD;  Location: [facility];  Service: Endoscopy;  Laterality: N/A;    ESOPHAGOGASTRODUODENOSCOPY N/A 3/5/2024    Procedure: EGD (ESOPHAGOGASTRODUODENOSCOPY);  Surgeon: Rivera Nava III, MD;  Location: [facility];  Service: Endoscopy;  Laterality: N/A;     Family History   Problem Relation Name Age of Onset    Rheum arthritis Mother      Hypertension Mother      Iron deficiency Mother      Hyperlipidemia Mother      Liver disease Father      Hyperlipidemia  Father      Neuropathy Father      GI problems Brother      Kidney disease Maternal Grandmother      Alzheimer's disease Maternal Grandfather      GI problems Paternal Grandmother      Diabetes Paternal Grandmother      Stroke Paternal Grandmother      Heart attack Paternal Grandmother      Lymphoma Paternal Grandfather       Social History     Tobacco Use    Smoking status: Never    Smokeless tobacco: Never   Substance Use Topics    Alcohol use: Yes     Alcohol/week: 1.0 standard drink of alcohol     Types: 1 Shots of liquor per week    Drug use: Never        Review of Systems:  Review of Systems   Constitutional:  Negative for chills, diaphoresis, fever, malaise/fatigue and weight loss.   HENT:  Negative for congestion.    Respiratory:  Negative for shortness of breath.    Cardiovascular:  Negative for chest pain and leg swelling.   Gastrointestinal:  Positive for abdominal pain and blood in stool. Negative for constipation, nausea and vomiting.   Genitourinary:  Negative for dysuria.   Musculoskeletal:  Negative for back pain and myalgias.   Skin:  Negative for rash.   Neurological:  Negative for dizziness and weakness.   Endo/Heme/Allergies:  Does not bruise/bleed easily.   Psychiatric/Behavioral:  Negative for depression.        OBJECTIVE:     Vital Signs (Most Recent)  [facility] 11/05/2024 (Exact Date)     Physical Exam:  General:  female in no distress   Neuro: alert and oriented x 4.  Moves all extremities.     HEENT: no icterus.  Trachea midline  Respiratory: respirations are even and unlabored  Cardiac: regular rate  Abdomen:  Soft, nontender, no masses  Extremities: Warm dry and intact  Skin: no rashes  Anorectal:  External exam was normal.  Digital exam performed with normal resting tone.  No significant internal hemorrhoids palpated.  Moderate anterior rectocele palpated.  Detailed anoscopy performed.  Very small volume internal hemorrhoids.  One small partially thrombosed internal hemorrhoids seen  "posterior midline.    Labs:  H&H 14 and 43    Imaging:  No relevant abdominal imaging      ASSESSMENT/PLAN:     [facility]" was seen today for rectal bleeding and abdominal pain.    Diagnoses and all orders for this visit:    Bright red blood per rectum  -     Ambulatory referral/consult to Colorectal Surgery        29 y.o. female with painless bright red blood per rectum with associated right lower quadrant abdominal pain.  This may be related to underlying constipation.  Recommended taking MiraLax.  Anoscopy with small volume internal hemorrhoids.  She just had a normal colonoscopy several months ago.  Recommended starting with MiraLax and a probiotic.  If no improvement in 2-3 weeks, she will let me know and we will then proceed with CT scan of the abdomen pelvis.    DAMION Sanders MD, FACS, FASCRS  Staff Surgeon  Colon & Rectal Surgery      "

## 2024-11-18 PROBLEM — J45.990 EXERCISE-INDUCED ASTHMA: Status: ACTIVE | Noted: 2024-11-18

## 2024-12-27 ENCOUNTER — PATIENT MESSAGE (OUTPATIENT)
Dept: FAMILY MEDICINE | Facility: CLINIC | Age: 29
End: 2024-12-27
Payer: COMMERCIAL

## 2024-12-27 DIAGNOSIS — Z30.41 ORAL CONTRACEPTIVE PILL SURVEILLANCE: Primary | ICD-10-CM

## 2024-12-27 RX ORDER — DROSPIRENONE AND ETHINYL ESTRADIOL 0.02-3(28)
1 KIT ORAL DAILY
Qty: 84 TABLET | Refills: 3 | Status: SHIPPED | OUTPATIENT
Start: 2024-12-27

## 2024-12-31 ENCOUNTER — E-VISIT (OUTPATIENT)
Dept: FAMILY MEDICINE | Facility: CLINIC | Age: 29
End: 2024-12-31
Payer: COMMERCIAL

## 2024-12-31 DIAGNOSIS — F33.0 MILD EPISODE OF RECURRENT MAJOR DEPRESSIVE DISORDER: Primary | ICD-10-CM

## 2024-12-31 PROCEDURE — 99421 OL DIG E/M SVC 5-10 MIN: CPT | Mod: ,,, | Performed by: FAMILY MEDICINE

## 2025-01-10 RX ORDER — VENLAFAXINE HYDROCHLORIDE 75 MG/1
75 CAPSULE, EXTENDED RELEASE ORAL DAILY
Qty: 90 CAPSULE | Refills: 1 | Status: SHIPPED | OUTPATIENT
Start: 2025-01-10 | End: 2025-01-10

## 2025-01-10 RX ORDER — VENLAFAXINE HYDROCHLORIDE 75 MG/1
75 CAPSULE, EXTENDED RELEASE ORAL DAILY
Qty: 90 CAPSULE | Refills: 1 | Status: SHIPPED | OUTPATIENT
Start: 2025-01-10 | End: 2025-01-10 | Stop reason: SDUPTHER

## 2025-01-10 RX ORDER — VENLAFAXINE HYDROCHLORIDE 150 MG/1
150 CAPSULE, EXTENDED RELEASE ORAL DAILY
Qty: 90 CAPSULE | Refills: 3 | Status: SHIPPED | OUTPATIENT
Start: 2025-01-10

## 2025-01-10 NOTE — PROGRESS NOTES
"Patient ID: Tram Mindy Frank John is a 29 y.o. female.    Chief Complaint: Mood Disorder (Entered automatically based on patient selection in [employer].)    The patient initiated a request through [employer] on 12/31/2024 for evaluation and management with a chief complaint of Mood Disorder (Entered automatically based on patient selection in [employer].)     I evaluated the questionnaire submission on 01/10/2025  Please see the patient message thread for further details.       [facility] Evisit Anxiety/Depression    1/5/2025  9:03 PM CST - Filed by Patient   Do you agree to participate in an E-Visit? Yes   If you have any of the following symptoms, please present to your local emergency room or call 911:  I acknowledge   Medication requests for narcotics will not be addressed via an E-Visit.  Please schedule an appointment. I acknowledge   Choose the state of your primary residence [place]   Do you have any of the following pregnancy-related conditions? None   What is the main issue you would like addressed today? Nothingg   Fear of embarrassment causes me to avoid doing things or speaking to people. Yes   I avoid activities in which I am the center of attention. Yes   Being embarrassed or looking stupid are among my worst fears. No   I would like to address: Medication for Anxiety or Depression   By selecting "I understand," you acknowledge that the answers that you provide for this questionnaire may not be immediately viewed by your provider or your care team. If you are personally dealing with suicidal thoughts or a crisis, please consider contacting your provider's office.  If your provider is not available, please consider taking action by: calling 911 or the National Suicide Prevention Lifeline any day, any time at 1-198.323.5531 or texting SIGNS to 729074 for 24/7 anonymous, free crisis counseling. I understand   Over the last 2 weeks, how often have you been bothered by the following problems?   Little interest or " pleasure in doing things Several days   Feeling down, depressed, or hopeless Several days   PHQ-2 Score (range: 0 - 6) 2 (Further screening not recommended)   Feeling nervous, anxious, on edge Several days   Not being able to stop or control worrying Several days   Worrying too much about different things Several days   Trouble relaxing Not at all   Being so restless that its hard to sit still Not at all   Becoming easily annoyed or irritable Several days   Feeling afraid as if something awful might happen Not at all   If you marked you are experiencing any of the aforementioned problems, how difficult have these made it for you to do your work, take care of things at home, or get along with other people? Somewhat difficult   TOTAL SCORE: (range: 0 - 21) 4   Do you want to address a new or existing medication? I would like to address a medication I currently take   Would you like to change or continue your medication? Continue medication   What medication would you like to continue?  Effexor   Are you taking it as prescribed? Yes    What medical condition is the  medication intended to treat? Depression/anxiety   Is the medication helping your condition? Yes   Are you having any side effects from the medication? No   Provide any additional information you feel is important.    Please attach any relevant images or files    Are you able to take your vital signs? No         Encounter Diagnosis   Name Primary?    Mild episode of recurrent major depressive disorder Yes        No orders of the defined types were placed in this encounter.     Medications Ordered This Encounter   Medications    venlafaxine (EFFEXOR-XR) 75 MG 24 hr capsule     Sig: Take 1 capsule (75 mg total) by mouth once daily.     Dispense:  90 capsule     Refill:  1        1. Mild episode of recurrent major depressive disorder  -     Discontinue: venlafaxine (EFFEXOR-XR) 75 MG 24 hr capsule; Take 1 capsule (75 mg total) by mouth once daily. After 1  week of 37.5 mg  Dispense: 90 capsule; Refill: 1  -     venlafaxine (EFFEXOR-XR) 75 MG 24 hr capsule; Take 1 capsule (75 mg total) by mouth once daily.  Dispense: 90 capsule; Refill: 1        Follow up if symptoms worsen or fail to improve.      E-Visit Time Tracking:    Day 1 Time (in minutes): 8    Total Time (in minutes): 8

## 2025-02-18 ENCOUNTER — PATIENT MESSAGE (OUTPATIENT)
Facility: CLINIC | Age: 30
End: 2025-02-18
Payer: COMMERCIAL

## 2025-02-28 ENCOUNTER — TELEPHONE (OUTPATIENT)
Dept: INTERNAL MEDICINE | Facility: CLINIC | Age: 30
End: 2025-02-28
Payer: COMMERCIAL

## 2025-02-28 ENCOUNTER — OFFICE VISIT (OUTPATIENT)
Dept: FAMILY MEDICINE | Facility: CLINIC | Age: 30
End: 2025-02-28
Payer: COMMERCIAL

## 2025-02-28 ENCOUNTER — PATIENT MESSAGE (OUTPATIENT)
Dept: FAMILY MEDICINE | Facility: CLINIC | Age: 30
End: 2025-02-28

## 2025-02-28 ENCOUNTER — PATIENT MESSAGE (OUTPATIENT)
Dept: INTERNAL MEDICINE | Facility: CLINIC | Age: 30
End: 2025-02-28
Payer: COMMERCIAL

## 2025-02-28 DIAGNOSIS — K21.9 GASTROESOPHAGEAL REFLUX DISEASE WITHOUT ESOPHAGITIS: Primary | ICD-10-CM

## 2025-02-28 DIAGNOSIS — R11.0 NAUSEA: ICD-10-CM

## 2025-02-28 DIAGNOSIS — Z86.19 HISTORY OF HELICOBACTER PYLORI INFECTION: ICD-10-CM

## 2025-02-28 DIAGNOSIS — K21.9 GASTROESOPHAGEAL REFLUX DISEASE WITHOUT ESOPHAGITIS: ICD-10-CM

## 2025-02-28 RX ORDER — PANTOPRAZOLE SODIUM 40 MG/1
40 TABLET, DELAYED RELEASE ORAL DAILY
Qty: 90 TABLET | Refills: 3 | Status: SHIPPED | OUTPATIENT
Start: 2025-02-28

## 2025-02-28 RX ORDER — ONDANSETRON 4 MG/1
4 TABLET, ORALLY DISINTEGRATING ORAL EVERY 12 HOURS PRN
Qty: 60 TABLET | Refills: 3 | Status: SHIPPED | OUTPATIENT
Start: 2025-02-28

## 2025-02-28 RX ORDER — ONDANSETRON 4 MG/1
4 TABLET, ORALLY DISINTEGRATING ORAL EVERY 12 HOURS PRN
Qty: 60 TABLET | Refills: 3 | Status: CANCELLED | OUTPATIENT
Start: 2025-02-28

## 2025-02-28 RX ORDER — PANTOPRAZOLE SODIUM 20 MG/1
20 TABLET, DELAYED RELEASE ORAL DAILY
Qty: 30 TABLET | Refills: 0 | Status: SHIPPED | OUTPATIENT
Start: 2025-02-28

## 2025-02-28 NOTE — TELEPHONE ENCOUNTER
No care due was identified.  [facility] Embedded Care Due Messages. Reference number: [id number].   2/28/2025 4:22:17 PM CST

## 2025-02-28 NOTE — PROGRESS NOTES
Subjective:       Patient ID: Annelise John is a 30 y.o. female.    Chief Complaint: Gastroesophageal Reflux    Patient Active Problem List   Diagnosis    Mild episode of recurrent major depressive disorder    Chronic diarrhea    History of Helicobacter pylori infection    Mixed hyperlipidemia    Basilar migraine    Exercise-induced asthma      HPI    Alba is a 30 y.o. female  History of Present Illness    CHIEF COMPLAINT:  Annelise presents today with recurrent nausea and burning sensation    HISTORY OF PRESENT ILLNESS:  She reports experiencing nausea with vomiting for the past 2 days, accompanied by increased gas and a mild burning sensation. These symptoms are similar to her previous episodes. She is currently out of PPI medication, though she previously responded well to Pantoprazole therapy.    MEDICAL HISTORY:  She has a history of H. pylori treated approximately 1 year ago.         Regiment of Amoxicillin, Biaxin and protonix in 02/2024  Objective:   There were no vitals filed for this visit.      Physical Exam  Constitutional:       General: Patient is not in acute distress.     Appearance: Patient is well-developed. He is not diaphoretic.      Comments: Exam performed as able, but limited due to the inherent nature of telemedicine visit.    HENT:      Head: Normocephalic and atraumatic.   Eyes:      Conjunctiva/sclera: Conjunctivae normal.   Pulmonary:      Effort: No respiratory distress.      Comments: No audible wheezing  No visible respiratory distress  Musculoskeletal:      Cervical back: Normal range of motion.   Skin:     Findings: No rash.      Comments: No visible rash   Neurological:      Mental Status: Patient is alert. Mental status is at baseline.   Psychiatric:         Behavior: Behavior normal.         Thought Content: Thought content normal.       Assessment:       1. Gastroesophageal reflux disease without esophagitis    2. History of Helicobacter pylori infection    3. Nausea           Plan:   Recent relevant labs results reviewed with patient.         Assessment & Plan    Suspect possible H. pylori recurrence based on patient's symptoms of nausea, vomiting, gas, and mild burning sensation  Plan to start with non-invasive stool sample test for H. pylori before initiating PPI therapy  Consider empiric treatment for H. pylori if symptoms persist, using a different antibiotic regimen than previous treatment  EGD with biopsy remains an option if symptoms do not improve with PPI therapy or empiric treatment  Last done in 03/2024    FOLLOW UP:  - Scheduled a follow-up e-visit to assess symptom improvement and PPI efficacy.  - Planned a follow up in 6-8 weeks after starting the higher dose PPI to determine next steps.  - Considered EGD and biopsy if symptoms persist.  - Outlined treatment plan: stool test, PPI trial, and potential empiric treatment for H. pylori or referral for EGD.         1. Gastroesophageal reflux disease without esophagitis  -     pantoprazole (PROTONIX) 40 MG tablet; Take 1 tablet (40 mg total) by mouth once daily.  Dispense: 90 tablet; Refill: 3  -     pantoprazole (PROTONIX) 20 MG tablet; Take 1 tablet (20 mg total) by mouth once daily.  Dispense: 30 tablet; Refill: 0  -     ondansetron (ZOFRAN-ODT) 4 MG TbDL; Take 1 tablet (4 mg total) by mouth every 12 (twelve) hours as needed (nausea).  Dispense: 60 tablet; Refill: 3  -     [facility] E-Visit    2. History of Helicobacter pylori infection  Overview:  Treated 02/2024    Orders:  -     H. pylori antigen, stool; Future; Expected date: 02/28/2025  -     [facility] E-Visit    3. Nausea  -     ondansetron (ZOFRAN-ODT) 4 MG TbDL; Take 1 tablet (4 mg total) by mouth every 12 (twelve) hours as needed (nausea).  Dispense: 60 tablet; Refill: 3      Patient's questions answered. Plan reviewed with patient at the end of visit. Relevant precautions to chief complaint and reasons to seek further medical care or to contact the office sooner reviewed  with patient.     Follow up in about 4 months (around 6/28/2025) for Annual Exam, (prelabs), - already scheduled.        Part of this note was dictated using voice recognition software. Please excuse any typographical errors.     The patient location is: [place]  The chief complaint leading to consultation is: Gastroesophageal Reflux       Visit type: audiovisual    Face to Face time with patient: 10  15 minutes of total time spent on the encounter, which includes face to face time and non-face to face time preparing to see the patient (eg, review of tests), Obtaining and/or reviewing separately obtained history, Documenting clinical information in the electronic or other health record, Independently interpreting results (not separately reported) and communicating results to the patient/family/caregiver, or Care coordination (not separately reported).     Each patient to whom he or she provides medical services by telemedicine is:  (1) informed of the relationship between the physician and patient and the respective role of any other health care provider with respect to management of the patient; and (2) notified that he or she may decline to receive medical services by telemedicine and may withdraw from such care at any time.    This note was generated with the assistance of ambient listening technology. Verbal consent was obtained by the patient and accompanying visitor(s) for the recording of patient appointment to facilitate this note. I attest to having reviewed and edited the generated note for accuracy, though some syntax or spelling errors may persist. Please contact the author of this note for any clarification.

## 2025-03-05 ENCOUNTER — LAB VISIT (OUTPATIENT)
Dept: PRIMARY CARE CLINIC | Facility: CLINIC | Age: 30
End: 2025-03-05
Payer: COMMERCIAL

## 2025-03-05 DIAGNOSIS — A04.8 H. PYLORI INFECTION: ICD-10-CM

## 2025-03-05 DIAGNOSIS — Z86.19 HISTORY OF HELICOBACTER PYLORI INFECTION: ICD-10-CM

## 2025-03-05 PROCEDURE — 87338 HPYLORI STOOL AG IA: CPT | Performed by: FAMILY MEDICINE

## 2025-03-06 LAB
H.PYLORI ANTIGEN INTERPRETATION (OHS): NEGATIVE
H.PYLORI ANTIGEN INTERPRETATION (OHS): NEGATIVE

## 2025-03-07 ENCOUNTER — RESULTS FOLLOW-UP (OUTPATIENT)
Dept: FAMILY MEDICINE | Facility: CLINIC | Age: 30
End: 2025-03-07

## 2025-03-07 ENCOUNTER — PATIENT MESSAGE (OUTPATIENT)
Dept: FAMILY MEDICINE | Facility: CLINIC | Age: 30
End: 2025-03-07
Payer: COMMERCIAL

## 2025-03-07 DIAGNOSIS — R11.0 NAUSEA: Primary | ICD-10-CM

## 2025-03-07 RX ORDER — ONDANSETRON 4 MG/1
4 TABLET, FILM COATED ORAL EVERY 12 HOURS PRN
Qty: 60 TABLET | Refills: 5 | Status: SHIPPED | OUTPATIENT
Start: 2025-03-07

## 2025-03-10 ENCOUNTER — PATIENT MESSAGE (OUTPATIENT)
Dept: FAMILY MEDICINE | Facility: CLINIC | Age: 30
End: 2025-03-10
Payer: COMMERCIAL

## 2025-04-29 PROBLEM — K21.9 GASTROESOPHAGEAL REFLUX DISEASE WITHOUT ESOPHAGITIS: Status: ACTIVE | Noted: 2025-04-29

## 2025-07-17 ENCOUNTER — LAB VISIT (OUTPATIENT)
Dept: LAB | Facility: HOSPITAL | Age: 30
End: 2025-07-17
Attending: FAMILY MEDICINE
Payer: COMMERCIAL

## 2025-07-17 ENCOUNTER — OFFICE VISIT (OUTPATIENT)
Dept: FAMILY MEDICINE | Facility: CLINIC | Age: 30
End: 2025-07-17
Payer: COMMERCIAL

## 2025-07-17 VITALS
WEIGHT: 137.81 LBS | DIASTOLIC BLOOD PRESSURE: 72 MMHG | BODY MASS INDEX: 25.36 KG/M2 | HEART RATE: 76 BPM | OXYGEN SATURATION: 99 % | SYSTOLIC BLOOD PRESSURE: 110 MMHG | HEIGHT: 62 IN

## 2025-07-17 DIAGNOSIS — Z00.01 ENCOUNTER FOR GENERAL ADULT MEDICAL EXAMINATION WITH ABNORMAL FINDINGS: Primary | ICD-10-CM

## 2025-07-17 DIAGNOSIS — R10.84 ABDOMINAL CRAMPING, GENERALIZED: ICD-10-CM

## 2025-07-17 DIAGNOSIS — Z00.01 ENCOUNTER FOR GENERAL ADULT MEDICAL EXAMINATION WITH ABNORMAL FINDINGS: ICD-10-CM

## 2025-07-17 DIAGNOSIS — R11.0 NAUSEA: ICD-10-CM

## 2025-07-17 DIAGNOSIS — E78.2 MIXED HYPERLIPIDEMIA: ICD-10-CM

## 2025-07-17 DIAGNOSIS — G44.019 EPISODIC CLUSTER HEADACHE, NOT INTRACTABLE: ICD-10-CM

## 2025-07-17 DIAGNOSIS — F33.0 MILD EPISODE OF RECURRENT MAJOR DEPRESSIVE DISORDER: ICD-10-CM

## 2025-07-17 DIAGNOSIS — B00.2 RECURRENT ORAL HERPES SIMPLEX: ICD-10-CM

## 2025-07-17 DIAGNOSIS — Z86.19 HISTORY OF HELICOBACTER PYLORI INFECTION: ICD-10-CM

## 2025-07-17 DIAGNOSIS — Z30.41 ORAL CONTRACEPTIVE PILL SURVEILLANCE: ICD-10-CM

## 2025-07-17 DIAGNOSIS — K21.9 GASTROESOPHAGEAL REFLUX DISEASE WITHOUT ESOPHAGITIS: ICD-10-CM

## 2025-07-17 PROBLEM — G43.109 BASILAR MIGRAINE: Status: RESOLVED | Noted: 2024-06-12 | Resolved: 2025-07-17

## 2025-07-17 LAB
ABSOLUTE EOSINOPHIL (OHS): 0.2 K/UL
ABSOLUTE MONOCYTE (OHS): 0.45 K/UL (ref 0.3–1)
ABSOLUTE NEUTROPHIL COUNT (OHS): 3.48 K/UL (ref 1.8–7.7)
ALBUMIN SERPL BCP-MCNC: 3.9 G/DL (ref 3.5–5.2)
ALP SERPL-CCNC: 51 UNIT/L (ref 40–150)
ALT SERPL W/O P-5'-P-CCNC: 13 UNIT/L (ref 10–44)
ANION GAP (OHS): 7 MMOL/L (ref 8–16)
AST SERPL-CCNC: 17 UNIT/L (ref 11–45)
BASOPHILS # BLD AUTO: 0.05 K/UL
BASOPHILS NFR BLD AUTO: 0.9 %
BILIRUB SERPL-MCNC: 0.3 MG/DL (ref 0.1–1)
BUN SERPL-MCNC: 13 MG/DL (ref 6–20)
CALCIUM SERPL-MCNC: 9.4 MG/DL (ref 8.7–10.5)
CHLORIDE SERPL-SCNC: 106 MMOL/L (ref 95–110)
CHOLEST SERPL-MCNC: 234 MG/DL (ref 120–199)
CHOLEST/HDLC SERPL: 2.6 {RATIO} (ref 2–5)
CO2 SERPL-SCNC: 24 MMOL/L (ref 23–29)
CREAT SERPL-MCNC: 0.7 MG/DL (ref 0.5–1.4)
EAG (OHS): 91 MG/DL (ref 68–131)
ERYTHROCYTE [DISTWIDTH] IN BLOOD BY AUTOMATED COUNT: 13.4 % (ref 11.5–14.5)
GFR SERPLBLD CREATININE-BSD FMLA CKD-EPI: >60 ML/MIN/1.73/M2
GLUCOSE SERPL-MCNC: 88 MG/DL (ref 70–110)
HBA1C MFR BLD: 4.8 % (ref 4–5.6)
HCT VFR BLD AUTO: 42.4 % (ref 37–48.5)
HDLC SERPL-MCNC: 89 MG/DL (ref 40–75)
HDLC SERPL: 38 % (ref 20–50)
HGB BLD-MCNC: 13.5 GM/DL (ref 12–16)
IMM GRANULOCYTES # BLD AUTO: 0.02 K/UL (ref 0–0.04)
IMM GRANULOCYTES NFR BLD AUTO: 0.4 % (ref 0–0.5)
LDLC SERPL CALC-MCNC: 119 MG/DL (ref 63–159)
LYMPHOCYTES # BLD AUTO: 1.46 K/UL (ref 1–4.8)
MCH RBC QN AUTO: 28 PG (ref 27–31)
MCHC RBC AUTO-ENTMCNC: 31.8 G/DL (ref 32–36)
MCV RBC AUTO: 88 FL (ref 82–98)
NONHDLC SERPL-MCNC: 145 MG/DL
NUCLEATED RBC (/100WBC) (OHS): 0 /100 WBC
PLATELET # BLD AUTO: 333 K/UL (ref 150–450)
PMV BLD AUTO: 9.9 FL (ref 9.2–12.9)
POTASSIUM SERPL-SCNC: 5 MMOL/L (ref 3.5–5.1)
PROT SERPL-MCNC: 7.6 GM/DL (ref 6–8.4)
RBC # BLD AUTO: 4.82 M/UL (ref 4–5.4)
RELATIVE EOSINOPHIL (OHS): 3.5 %
RELATIVE LYMPHOCYTE (OHS): 25.8 % (ref 18–48)
RELATIVE MONOCYTE (OHS): 8 % (ref 4–15)
RELATIVE NEUTROPHIL (OHS): 61.4 % (ref 38–73)
SODIUM SERPL-SCNC: 137 MMOL/L (ref 136–145)
TRIGL SERPL-MCNC: 130 MG/DL (ref 30–150)
TSH SERPL-ACNC: 2.11 UIU/ML (ref 0.4–4)
WBC # BLD AUTO: 5.66 K/UL (ref 3.9–12.7)

## 2025-07-17 PROCEDURE — 36415 COLL VENOUS BLD VENIPUNCTURE: CPT | Mod: PO

## 2025-07-17 PROCEDURE — 80053 COMPREHEN METABOLIC PANEL: CPT

## 2025-07-17 PROCEDURE — 83036 HEMOGLOBIN GLYCOSYLATED A1C: CPT

## 2025-07-17 PROCEDURE — 99999 PR PBB SHADOW E&M-EST. PATIENT-LVL III: CPT | Mod: PBBFAC,,, | Performed by: FAMILY MEDICINE

## 2025-07-17 PROCEDURE — 80061 LIPID PANEL: CPT

## 2025-07-17 PROCEDURE — 84443 ASSAY THYROID STIM HORMONE: CPT

## 2025-07-17 PROCEDURE — 85025 COMPLETE CBC W/AUTO DIFF WBC: CPT

## 2025-07-17 RX ORDER — VALACYCLOVIR HYDROCHLORIDE 1 G/1
2000 TABLET, FILM COATED ORAL EVERY 12 HOURS
Qty: 12 TABLET | Refills: 2 | Status: SHIPPED | OUTPATIENT
Start: 2025-07-17 | End: 2025-07-20

## 2025-07-17 RX ORDER — PREDNISONE 20 MG/1
TABLET ORAL
Qty: 48 TABLET | Refills: 0 | Status: SHIPPED | OUTPATIENT
Start: 2025-07-17 | End: 2025-08-06

## 2025-07-17 RX ORDER — DROSPIRENONE AND ETHINYL ESTRADIOL 0.02-3(28)
1 KIT ORAL DAILY
Qty: 84 TABLET | Refills: 3 | Status: SHIPPED | OUTPATIENT
Start: 2025-07-17

## 2025-07-17 RX ORDER — PANTOPRAZOLE SODIUM 40 MG/1
40 TABLET, DELAYED RELEASE ORAL DAILY
Qty: 90 TABLET | Refills: 3 | Status: SHIPPED | OUTPATIENT
Start: 2025-07-17

## 2025-07-17 RX ORDER — ONDANSETRON 4 MG/1
4 TABLET, FILM COATED ORAL EVERY 12 HOURS PRN
Qty: 60 TABLET | Refills: 5 | Status: SHIPPED | OUTPATIENT
Start: 2025-07-17

## 2025-07-17 RX ORDER — VENLAFAXINE HYDROCHLORIDE 150 MG/1
150 CAPSULE, EXTENDED RELEASE ORAL DAILY
Qty: 90 CAPSULE | Refills: 3 | Status: SHIPPED | OUTPATIENT
Start: 2025-07-17

## 2025-07-17 RX ORDER — FAMOTIDINE 20 MG/1
20 TABLET, FILM COATED ORAL 2 TIMES DAILY
Qty: 180 TABLET | Refills: 2 | Status: SHIPPED | OUTPATIENT
Start: 2025-07-17

## 2025-07-17 NOTE — PROGRESS NOTES
Subjective:         Patient ID: Annelise John is a 30 y.o. female.    Chief Complaint: Annual Exam    Patient Active Problem List   Diagnosis    Mild episode of recurrent major depressive disorder    Chronic diarrhea    History of Helicobacter pylori infection    Mixed hyperlipidemia    Exercise-induced asthma    Gastroesophageal reflux disease without esophagitis      CYNDEE Willis is a 30 y.o. female    History of Present Illness    CHIEF COMPLAINT:  Annelise presents today for annual exam.    HISTORY OF PRESENT ILLNESS - CLUSTER HEADACHES:  She reports new onset of cluster headaches starting 2-3 months ago. She has experienced three episodes in the past three months, with the most recent occurring on Monday. The first episode lasted three days, during which she was unable to function and remained in bed. She describes intense pain at the back of her head, with pillow contact being extremely uncomfortable. She experiences significant photophobia, requiring a sleeping mask throughout the day, and concurrent nausea during episodes. During these episodes, she reports being completely non-functional, with pain so severe that she cannot interact with others or leave her bed. She attempted to manage pain with Tylenol 2000 mg and Advil 2000 mg without relief, and found partial relief with Excedrin, requiring 4-5 tablets in 24 hrs to achieve symptomatic improvement.    MEDICAL HISTORY:  She was previously diagnosed with basilar migraines, though recent consultation with headache specialist suggested the prior ER visit was related to serotonin withdrawal from antidepressant non-adherence. Current headache pattern differs from 2024 episode, characterized by a distinct cluster pattern not present in previous episodes. Brain MRI performed in 2024 was unremarkable. She notes these cluster headaches feel distinctly different from her previous migraine experiences. Recent stool test was negative for H. pylori, indicating  "successful treatment response. She reports resolution of nausea previously experienced from H. pylori treatment.    CURRENT MEDICATIONS:  She takes Vani birth control with no recent adjustments, Protonix 40 mg as needed, and Effexor 150 mg consistently.    FAMILY HISTORY:  No family history of migraines. Mother diagnosed with rheumatoid arthritis in her 30s.    MUSCULOSKELETAL:  She reports work-related elbow soreness attributed to prolonged work posture and extended sitting. She describes the soreness as mild and localized to elbows. She denies significant joint inflammation or widespread joint pain beyond minor small hand joint discomfort.    HERPES SIMPLEX:  She reports early signs of potential herpes simplex outbreak, describing a developing lesion on her lip. She is uncertain whether the lesion is a pimple or an initial herpes blister, but notes she is experiencing initial sensations of an emerging lesion.         Objective:     Vitals:    07/17/25 0710   BP: 110/72   BP Location: Left arm   Patient Position: Sitting   Pulse: 76   SpO2: 99%   Weight: 62.5 kg (137 lb 12.6 oz)   Height: 5' 2" (1.575 m)         Physical Exam  Vitals and nursing note reviewed.   Constitutional:       General: She is not in acute distress.     Appearance: Normal appearance. She is not ill-appearing, toxic-appearing or diaphoretic.   HENT:      Head: Normocephalic and atraumatic.   Eyes:      General: No scleral icterus.     Conjunctiva/sclera: Conjunctivae normal.   Cardiovascular:      Rate and Rhythm: Normal rate.   Pulmonary:      Effort: Pulmonary effort is normal. No respiratory distress.   Skin:     Coloration: Skin is not pale.   Neurological:      Mental Status: She is alert. Mental status is at baseline.   Psychiatric:         Attention and Perception: Attention and perception normal.         Mood and Affect: Mood and affect normal.         Speech: Speech normal.         Behavior: Behavior normal.         Cognition and " Memory: Cognition and memory normal.         Judgment: Judgment normal.       Assessment:       1. Encounter for general adult medical examination with abnormal findings    2. Episodic cluster headache, not intractable    3. Mixed hyperlipidemia    4. Mild episode of recurrent major depressive disorder    5. Gastroesophageal reflux disease without esophagitis    6. History of Helicobacter pylori infection    7. Nausea    8. Abdominal cramping, generalized    9. Oral contraceptive pill surveillance    10. Recurrent oral herpes simplex          Plan:   Recent relevant labs results reviewed with patient.         Assessment & Plan    Assessed new headache symptoms as likely episodic cluster headaches rather than basilar migraines based on pattern and presentation.  Treated as episodic cluster headaches - O2 if has access, prednisone high dose with taper with next cluster. If ongoing or recurs, consider home O2 order vs starting on Verapamil  Plan high-dose steroid course for acute episodes to break the headache cycle.  Considered calcium channel blocker as next step if steroid treatment ineffective.    - Ordered labs including lipid panel, renal function tests, and thyroid monitoring.  - Follow up in 1 year for next annual exam.        1. Encounter for general adult medical examination with abnormal findings  -     Comprehensive Metabolic Panel; Future; Expected date: 07/17/2025  -     CBC Auto Differential; Future; Expected date: 07/17/2025  -     Lipid Panel; Future; Expected date: 07/17/2025  -     Hemoglobin A1C; Future; Expected date: 07/17/2025  -     TSH; Future; Expected date: 07/17/2025  -     Comprehensive Metabolic Panel; Future; Expected date: 07/17/2026  -     CBC Auto Differential; Future; Expected date: 07/17/2026  -     Lipid Panel; Future; Expected date: 07/17/2026  -     Hemoglobin A1C; Future; Expected date: 07/17/2026  -     TSH; Future; Expected date: 07/17/2026  - Risk and age appropriate  anticipatory guidance.  reviewed and updated. Recommendations discussed with patient as appropriate.     2. Episodic cluster headache, not intractable  -     predniSONE (DELTASONE) 20 MG tablet; Take 5 tablets (100 mg total) by mouth once daily for 3 days, THEN 4 tablets (80 mg total) once daily for 3 days, THEN 3 tablets (60 mg total) once daily for 3 days, THEN 2 tablets (40 mg total) once daily for 3 days, THEN 1 tablet (20 mg total) once daily for 3 days, THEN 0.5 tablets (10 mg total) once daily for 5 days.  Dispense: 48 tablet; Refill: 0  [facility]    3. Mixed hyperlipidemia  -     Lipid Panel; Future; Expected date: 07/17/2025  -     Lipid Panel; Future; Expected date: 07/17/2026  Improved    4. Mild episode of recurrent major depressive disorder  -     venlafaxine (EFFEXOR-XR) 150 MG Cp24; Take 1 capsule (150 mg total) by mouth once daily.  Dispense: 90 capsule; Refill: 3  Chronic, stable    5. Gastroesophageal reflux disease without esophagitis  -     pantoprazole (PROTONIX) 40 MG tablet; Take 1 tablet (40 mg total) by mouth once daily.  Dispense: 90 tablet; Refill: 3  Chronic, stable    6. History of Helicobacter pylori infection  Overview:  Treated 02/2024  No recurrence at this time    7. Nausea  -     ondansetron (ZOFRAN) 4 MG tablet; Take 1 tablet (4 mg total) by mouth every 12 (twelve) hours as needed for Nausea.  Dispense: 60 tablet; Refill: 5  8. Abdominal cramping, generalized  -     famotidine (PEPCID) 20 MG tablet; Take 1 tablet (20 mg total) by mouth 2 (two) times daily.  Dispense: 180 tablet; Refill: 2  Improved overall - continue treatments prn    9. Oral contraceptive pill surveillance  -     drospirenone-ethinyl estradioL ([place], 28,) 3-0.02 mg per tablet; Take 1 tablet by mouth once daily.  Dispense: 84 tablet; Refill: 3  Chronic, stable    10. Recurrent oral herpes simplex  -     valACYclovir (VALTREX) 1000 MG tablet; Take 2 tablets (2,000 mg total) by mouth every 12 (twelve) hours. for 3 days   Dispense: 12 tablet; Refill: 2    Patient's questions answered. Plan reviewed with patient at the end of visit. Relevant precautions to chief complaint and reasons to seek further medical care or to contact the office sooner reviewed with patient.     Follow up in about 1 year (around 7/17/2026) for Annual Exam, (prelabs).        Part of this note was dictated using voice recognition software. Please excuse any typographical errors.     This note was generated with the assistance of ambient listening technology. Verbal consent was obtained by the patient and accompanying visitor(s) for the recording of patient appointment to facilitate this note. I attest to having reviewed and edited the generated note for accuracy, though some syntax or spelling errors may persist. Please contact the author of this note for any clarification.